# Patient Record
Sex: FEMALE | Race: WHITE | Employment: UNEMPLOYED | URBAN - METROPOLITAN AREA
[De-identification: names, ages, dates, MRNs, and addresses within clinical notes are randomized per-mention and may not be internally consistent; named-entity substitution may affect disease eponyms.]

---

## 2017-02-17 ENCOUNTER — OFFICE VISIT (OUTPATIENT)
Dept: FAMILY MEDICINE CLINIC | Age: 52
End: 2017-02-17

## 2017-02-17 VITALS
HEIGHT: 66 IN | BODY MASS INDEX: 20.38 KG/M2 | DIASTOLIC BLOOD PRESSURE: 82 MMHG | HEART RATE: 69 BPM | SYSTOLIC BLOOD PRESSURE: 110 MMHG | WEIGHT: 126.8 LBS

## 2017-02-17 DIAGNOSIS — E07.9 THYROID DISORDER: ICD-10-CM

## 2017-02-17 DIAGNOSIS — Z00.00 ROUTINE PHYSICAL EXAMINATION: ICD-10-CM

## 2017-02-17 DIAGNOSIS — Z00.00 ROUTINE PHYSICAL EXAMINATION: Primary | ICD-10-CM

## 2017-02-17 DIAGNOSIS — L30.9 DERMATITIS: ICD-10-CM

## 2017-02-17 DIAGNOSIS — E78.00 HYPERCHOLESTEREMIA: ICD-10-CM

## 2017-02-17 DIAGNOSIS — E55.9 VITAMIN D DEFICIENCY: ICD-10-CM

## 2017-02-17 DIAGNOSIS — I10 ESSENTIAL HYPERTENSION: ICD-10-CM

## 2017-02-17 DIAGNOSIS — Z23 NEED FOR TDAP VACCINATION: ICD-10-CM

## 2017-02-17 LAB
A/G RATIO: 1.5 (ref 1.1–2.2)
ALBUMIN SERPL-MCNC: 4.4 G/DL (ref 3.4–5)
ALP BLD-CCNC: 72 U/L (ref 40–129)
ALT SERPL-CCNC: 19 U/L (ref 10–40)
ANION GAP SERPL CALCULATED.3IONS-SCNC: 18 MMOL/L (ref 3–16)
AST SERPL-CCNC: 27 U/L (ref 15–37)
BASOPHILS ABSOLUTE: 0.1 K/UL (ref 0–0.2)
BASOPHILS RELATIVE PERCENT: 1.1 %
BILIRUB SERPL-MCNC: 0.5 MG/DL (ref 0–1)
BUN BLDV-MCNC: 9 MG/DL (ref 7–20)
CALCIUM SERPL-MCNC: 9.6 MG/DL (ref 8.3–10.6)
CHLORIDE BLD-SCNC: 103 MMOL/L (ref 99–110)
CHOLESTEROL, TOTAL: 204 MG/DL (ref 0–199)
CO2: 23 MMOL/L (ref 21–32)
CREAT SERPL-MCNC: 0.7 MG/DL (ref 0.6–1.1)
EOSINOPHILS ABSOLUTE: 0.3 K/UL (ref 0–0.6)
EOSINOPHILS RELATIVE PERCENT: 6.3 %
GFR AFRICAN AMERICAN: >60
GFR NON-AFRICAN AMERICAN: >60
GLOBULIN: 3 G/DL
GLUCOSE BLD-MCNC: 89 MG/DL (ref 70–99)
HCT VFR BLD CALC: 42.5 % (ref 36–48)
HDLC SERPL-MCNC: 94 MG/DL (ref 40–60)
HEMOGLOBIN: 14.1 G/DL (ref 12–16)
LDL CHOLESTEROL CALCULATED: 95 MG/DL
LYMPHOCYTES ABSOLUTE: 1.9 K/UL (ref 1–5.1)
LYMPHOCYTES RELATIVE PERCENT: 34.6 %
MCH RBC QN AUTO: 30.4 PG (ref 26–34)
MCHC RBC AUTO-ENTMCNC: 33.2 G/DL (ref 31–36)
MCV RBC AUTO: 91.5 FL (ref 80–100)
MONOCYTES ABSOLUTE: 0.5 K/UL (ref 0–1.3)
MONOCYTES RELATIVE PERCENT: 8.2 %
NEUTROPHILS ABSOLUTE: 2.7 K/UL (ref 1.7–7.7)
NEUTROPHILS RELATIVE PERCENT: 49.8 %
PDW BLD-RTO: 14 % (ref 12.4–15.4)
PLATELET # BLD: 178 K/UL (ref 135–450)
PMV BLD AUTO: 9.8 FL (ref 5–10.5)
POTASSIUM SERPL-SCNC: 4.8 MMOL/L (ref 3.5–5.1)
RBC # BLD: 4.65 M/UL (ref 4–5.2)
SODIUM BLD-SCNC: 144 MMOL/L (ref 136–145)
TOTAL PROTEIN: 7.4 G/DL (ref 6.4–8.2)
TRIGL SERPL-MCNC: 77 MG/DL (ref 0–150)
TSH REFLEX: 1.96 UIU/ML (ref 0.27–4.2)
VLDLC SERPL CALC-MCNC: 15 MG/DL
WBC # BLD: 5.5 K/UL (ref 4–11)

## 2017-02-17 PROCEDURE — 90715 TDAP VACCINE 7 YRS/> IM: CPT | Performed by: FAMILY MEDICINE

## 2017-02-17 PROCEDURE — 99396 PREV VISIT EST AGE 40-64: CPT | Performed by: FAMILY MEDICINE

## 2017-02-17 PROCEDURE — 90471 IMMUNIZATION ADMIN: CPT | Performed by: FAMILY MEDICINE

## 2017-02-17 RX ORDER — LISINOPRIL 5 MG/1
5 TABLET ORAL DAILY
Qty: 30 TABLET | Refills: 5 | Status: SHIPPED | OUTPATIENT
Start: 2017-02-17 | End: 2017-08-12 | Stop reason: SDUPTHER

## 2017-02-17 ASSESSMENT — ENCOUNTER SYMPTOMS
ALLERGIC/IMMUNOLOGIC NEGATIVE: 1
EYES NEGATIVE: 1

## 2017-02-18 LAB — VITAMIN D 25-HYDROXY: 49.3 NG/ML

## 2017-08-12 DIAGNOSIS — I10 ESSENTIAL HYPERTENSION: ICD-10-CM

## 2017-08-14 RX ORDER — LISINOPRIL 5 MG/1
TABLET ORAL
Qty: 30 TABLET | Refills: 4 | Status: SHIPPED | OUTPATIENT
Start: 2017-08-14 | End: 2018-01-14 | Stop reason: SDUPTHER

## 2017-09-25 ENCOUNTER — HOSPITAL ENCOUNTER (OUTPATIENT)
Dept: ENDOSCOPY | Age: 52
Discharge: HOME | End: 2017-09-25
Attending: INTERNAL MEDICINE | Admitting: INTERNAL MEDICINE

## 2017-09-25 VITALS
SYSTOLIC BLOOD PRESSURE: 121 MMHG | OXYGEN SATURATION: 100 % | HEART RATE: 55 BPM | HEIGHT: 65 IN | RESPIRATION RATE: 16 BRPM | BODY MASS INDEX: 22.33 KG/M2 | WEIGHT: 134 LBS | DIASTOLIC BLOOD PRESSURE: 80 MMHG | TEMPERATURE: 97 F

## 2017-09-25 RX ORDER — OXYCODONE HYDROCHLORIDE AND ACETAMINOPHEN 5; 325 MG/1; MG/1
2 TABLET ORAL PRN
Status: ACTIVE | OUTPATIENT
Start: 2017-09-25 | End: 2017-09-25

## 2017-09-25 RX ORDER — SODIUM CHLORIDE 0.9 % (FLUSH) 0.9 %
10 SYRINGE (ML) INJECTION PRN
Status: DISCONTINUED | OUTPATIENT
Start: 2017-09-25 | End: 2017-09-26 | Stop reason: HOSPADM

## 2017-09-25 RX ORDER — SODIUM CHLORIDE 9 MG/ML
INJECTION, SOLUTION INTRAVENOUS CONTINUOUS
Status: DISCONTINUED | OUTPATIENT
Start: 2017-09-25 | End: 2017-09-26 | Stop reason: HOSPADM

## 2017-09-25 RX ORDER — FENTANYL CITRATE 50 UG/ML
50 INJECTION, SOLUTION INTRAMUSCULAR; INTRAVENOUS EVERY 5 MIN PRN
Status: DISCONTINUED | OUTPATIENT
Start: 2017-09-25 | End: 2017-09-26 | Stop reason: HOSPADM

## 2017-09-25 RX ORDER — MORPHINE SULFATE 4 MG/ML
1 INJECTION, SOLUTION INTRAMUSCULAR; INTRAVENOUS EVERY 5 MIN PRN
Status: DISCONTINUED | OUTPATIENT
Start: 2017-09-25 | End: 2017-09-26 | Stop reason: HOSPADM

## 2017-09-25 RX ORDER — MORPHINE SULFATE 4 MG/ML
2 INJECTION, SOLUTION INTRAMUSCULAR; INTRAVENOUS EVERY 5 MIN PRN
Status: DISCONTINUED | OUTPATIENT
Start: 2017-09-25 | End: 2017-09-26 | Stop reason: HOSPADM

## 2017-09-25 RX ORDER — MEPERIDINE HYDROCHLORIDE 25 MG/ML
12.5 INJECTION INTRAMUSCULAR; INTRAVENOUS; SUBCUTANEOUS EVERY 5 MIN PRN
Status: DISCONTINUED | OUTPATIENT
Start: 2017-09-25 | End: 2017-09-26 | Stop reason: HOSPADM

## 2017-09-25 RX ORDER — OXYCODONE HYDROCHLORIDE AND ACETAMINOPHEN 5; 325 MG/1; MG/1
1 TABLET ORAL PRN
Status: ACTIVE | OUTPATIENT
Start: 2017-09-25 | End: 2017-09-25

## 2017-09-25 RX ORDER — FENTANYL CITRATE 50 UG/ML
25 INJECTION, SOLUTION INTRAMUSCULAR; INTRAVENOUS EVERY 5 MIN PRN
Status: DISCONTINUED | OUTPATIENT
Start: 2017-09-25 | End: 2017-09-26 | Stop reason: HOSPADM

## 2017-09-25 RX ORDER — SODIUM CHLORIDE 0.9 % (FLUSH) 0.9 %
10 SYRINGE (ML) INJECTION EVERY 12 HOURS SCHEDULED
Status: DISCONTINUED | OUTPATIENT
Start: 2017-09-25 | End: 2017-09-26 | Stop reason: HOSPADM

## 2017-09-25 RX ORDER — ONDANSETRON 2 MG/ML
4 INJECTION INTRAMUSCULAR; INTRAVENOUS
Status: ACTIVE | OUTPATIENT
Start: 2017-09-25 | End: 2017-09-25

## 2017-09-25 RX ADMIN — SODIUM CHLORIDE: 9 INJECTION, SOLUTION INTRAVENOUS at 10:49

## 2017-09-25 ASSESSMENT — PAIN SCALES - GENERAL
PAINLEVEL_OUTOF10: 0

## 2017-09-25 ASSESSMENT — ENCOUNTER SYMPTOMS: SHORTNESS OF BREATH: 0

## 2017-09-25 ASSESSMENT — PAIN - FUNCTIONAL ASSESSMENT: PAIN_FUNCTIONAL_ASSESSMENT: 0-10

## 2018-01-14 DIAGNOSIS — I10 ESSENTIAL HYPERTENSION: ICD-10-CM

## 2018-01-15 RX ORDER — LISINOPRIL 5 MG/1
TABLET ORAL
Qty: 30 TABLET | Refills: 1 | Status: SHIPPED | OUTPATIENT
Start: 2018-01-15 | End: 2018-02-19 | Stop reason: SDUPTHER

## 2018-01-15 NOTE — TELEPHONE ENCOUNTER
Last seen 2/17/2017  No return date  Future appointment scheduled for 2/19/2018  Last labs 2/17/2017

## 2018-02-19 ENCOUNTER — OFFICE VISIT (OUTPATIENT)
Dept: FAMILY MEDICINE CLINIC | Age: 53
End: 2018-02-19

## 2018-02-19 VITALS
HEART RATE: 83 BPM | HEIGHT: 67 IN | WEIGHT: 141.6 LBS | DIASTOLIC BLOOD PRESSURE: 74 MMHG | OXYGEN SATURATION: 98 % | BODY MASS INDEX: 22.22 KG/M2 | SYSTOLIC BLOOD PRESSURE: 110 MMHG

## 2018-02-19 DIAGNOSIS — Z00.00 ROUTINE PHYSICAL EXAMINATION: Primary | ICD-10-CM

## 2018-02-19 DIAGNOSIS — I10 ESSENTIAL HYPERTENSION: ICD-10-CM

## 2018-02-19 DIAGNOSIS — L41.0 PITYRIASIS LICHENOIDES: ICD-10-CM

## 2018-02-19 DIAGNOSIS — Z13.220 LIPID SCREENING: ICD-10-CM

## 2018-02-19 DIAGNOSIS — E07.9 THYROID DISORDER: ICD-10-CM

## 2018-02-19 DIAGNOSIS — Z13.1 DIABETES MELLITUS SCREENING: ICD-10-CM

## 2018-02-19 DIAGNOSIS — G47.9 SLEEP DISORDER: ICD-10-CM

## 2018-02-19 PROCEDURE — 99396 PREV VISIT EST AGE 40-64: CPT | Performed by: FAMILY MEDICINE

## 2018-02-19 RX ORDER — LISINOPRIL 5 MG/1
TABLET ORAL
Qty: 90 TABLET | Refills: 2 | Status: SHIPPED | OUTPATIENT
Start: 2018-02-19 | End: 2018-12-15 | Stop reason: SDUPTHER

## 2018-02-19 RX ORDER — DOXEPIN HYDROCHLORIDE 10 MG/1
10 CAPSULE ORAL NIGHTLY
Qty: 30 CAPSULE | Refills: 1 | Status: SHIPPED | OUTPATIENT
Start: 2018-02-19 | End: 2019-02-04

## 2018-02-19 ASSESSMENT — ENCOUNTER SYMPTOMS
ALLERGIC/IMMUNOLOGIC NEGATIVE: 1
RESPIRATORY NEGATIVE: 1
EYES NEGATIVE: 1
SORE THROAT: 0

## 2018-02-19 ASSESSMENT — PATIENT HEALTH QUESTIONNAIRE - PHQ9
2. FEELING DOWN, DEPRESSED OR HOPELESS: 0
1. LITTLE INTEREST OR PLEASURE IN DOING THINGS: 0
SUM OF ALL RESPONSES TO PHQ QUESTIONS 1-9: 0
SUM OF ALL RESPONSES TO PHQ9 QUESTIONS 1 & 2: 0

## 2018-02-19 NOTE — PROGRESS NOTES
Subjective:      Patient ID: Kezia Garza is a 50 y.o. female. Chief Complaint   Patient presents with    Annual Exam       HPI   difficult to sleep   She can't shut her mind   snore difficult + ear plug  Father + using Ambien   Pt tried OTC Melatonin  Cold Rx work one night but not next night  She feels tired all day long  Lipid disorder she did try healthy diet & exercise she did be super strict   Past Medical History:   Diagnosis Date    Hypertension      Past Surgical History:   Procedure Laterality Date    HYSTERECTOMY      SARKIS AND BSO  2007    FHx Mother +ovarian cancer    THYROIDECTOMY, PARTIAL  2005    Nodule ,Benign Lt     Current Outpatient Prescriptions   Medication Sig Dispense Refill    doxepin (SINEQUAN) 10 MG capsule Take 1 capsule by mouth nightly 30 capsule 1    lisinopril (PRINIVIL;ZESTRIL) 5 MG tablet TAKE ONE TABLET BY MOUTH DAILY 90 tablet 2    Cholecalciferol (VITAMIN D) 2000 units CAPS capsule Take by mouth      aspirin 81 MG tablet Take 81 mg by mouth daily       No current facility-administered medications for this visit.           No Known Allergies  Family History   Problem Relation Age of Onset    Cancer Mother      ovarian cancer @age 54    Cancer Father      Now 75 yo     Social History     Social History    Marital status:      Spouse name: Marya Armstrong ( 2007)    Number of children: 2    Years of education: N/A     Occupational History          Layed off few years ago Stay home          takes care of Horse     Social History Main Topics    Smoking status: Never Smoker    Smokeless tobacco: Never Used    Alcohol use No    Drug use: No    Sexual activity: Yes     Partners: Male      Comment: M ( 2 nd )+two kids 25 &28 yo     Other Topics Concern    Not on file     Social History Narrative    New G baby     Immunization History   Administered Date(s) Administered    Influenza Virus Vaccine 10/08/2012, 01/02/2018    Influenza, Intradermal,

## 2018-02-20 LAB
A/G RATIO: 1.7 (ref 1.1–2.2)
ALBUMIN SERPL-MCNC: 4.7 G/DL (ref 3.4–5)
ALP BLD-CCNC: 70 U/L (ref 40–129)
ALT SERPL-CCNC: 12 U/L (ref 10–40)
ANION GAP SERPL CALCULATED.3IONS-SCNC: 13 MMOL/L (ref 3–16)
AST SERPL-CCNC: 25 U/L (ref 15–37)
BASOPHILS ABSOLUTE: 0 K/UL (ref 0–0.2)
BASOPHILS RELATIVE PERCENT: 0.9 %
BILIRUB SERPL-MCNC: 0.4 MG/DL (ref 0–1)
BUN BLDV-MCNC: 14 MG/DL (ref 7–20)
CALCIUM SERPL-MCNC: 9.9 MG/DL (ref 8.3–10.6)
CHLORIDE BLD-SCNC: 104 MMOL/L (ref 99–110)
CHOLESTEROL, TOTAL: 255 MG/DL (ref 0–199)
CO2: 26 MMOL/L (ref 21–32)
CREAT SERPL-MCNC: 0.7 MG/DL (ref 0.6–1.1)
EOSINOPHILS ABSOLUTE: 0.2 K/UL (ref 0–0.6)
EOSINOPHILS RELATIVE PERCENT: 3.2 %
ESTIMATED AVERAGE GLUCOSE: 102.5 MG/DL
GFR AFRICAN AMERICAN: >60
GFR NON-AFRICAN AMERICAN: >60
GLOBULIN: 2.8 G/DL
GLUCOSE BLD-MCNC: 90 MG/DL (ref 70–99)
HBA1C MFR BLD: 5.2 %
HCT VFR BLD CALC: 41.8 % (ref 36–48)
HDLC SERPL-MCNC: 82 MG/DL (ref 40–60)
HEMOGLOBIN: 14 G/DL (ref 12–16)
LDL CHOLESTEROL CALCULATED: 146 MG/DL
LYMPHOCYTES ABSOLUTE: 1.7 K/UL (ref 1–5.1)
LYMPHOCYTES RELATIVE PERCENT: 30.4 %
MCH RBC QN AUTO: 30.8 PG (ref 26–34)
MCHC RBC AUTO-ENTMCNC: 33.4 G/DL (ref 31–36)
MCV RBC AUTO: 92.1 FL (ref 80–100)
MONOCYTES ABSOLUTE: 0.4 K/UL (ref 0–1.3)
MONOCYTES RELATIVE PERCENT: 7 %
NEUTROPHILS ABSOLUTE: 3.3 K/UL (ref 1.7–7.7)
NEUTROPHILS RELATIVE PERCENT: 58.5 %
PDW BLD-RTO: 13.7 % (ref 12.4–15.4)
PLATELET # BLD: 199 K/UL (ref 135–450)
PMV BLD AUTO: 9.6 FL (ref 5–10.5)
POTASSIUM SERPL-SCNC: 4.5 MMOL/L (ref 3.5–5.1)
RBC # BLD: 4.54 M/UL (ref 4–5.2)
SODIUM BLD-SCNC: 143 MMOL/L (ref 136–145)
TOTAL PROTEIN: 7.5 G/DL (ref 6.4–8.2)
TRIGL SERPL-MCNC: 135 MG/DL (ref 0–150)
TSH SERPL DL<=0.05 MIU/L-ACNC: 2.61 UIU/ML (ref 0.27–4.2)
VLDLC SERPL CALC-MCNC: 27 MG/DL
WBC # BLD: 5.6 K/UL (ref 4–11)

## 2018-02-21 RX ORDER — ROSUVASTATIN CALCIUM 10 MG/1
10 TABLET, COATED ORAL NIGHTLY
Qty: 30 TABLET | Refills: 3 | Status: SHIPPED | OUTPATIENT
Start: 2018-02-21 | End: 2018-06-15 | Stop reason: SDUPTHER

## 2018-05-29 DIAGNOSIS — E78.00 HYPERCHOLESTEREMIA: Primary | ICD-10-CM

## 2018-06-04 DIAGNOSIS — E78.00 HYPERCHOLESTEREMIA: ICD-10-CM

## 2018-06-05 LAB
A/G RATIO: 1.9 (ref 1.1–2.2)
ALBUMIN SERPL-MCNC: 4.7 G/DL (ref 3.4–5)
ALP BLD-CCNC: 69 U/L (ref 40–129)
ALT SERPL-CCNC: 15 U/L (ref 10–40)
ANION GAP SERPL CALCULATED.3IONS-SCNC: 15 MMOL/L (ref 3–16)
AST SERPL-CCNC: 25 U/L (ref 15–37)
BILIRUB SERPL-MCNC: 0.4 MG/DL (ref 0–1)
BUN BLDV-MCNC: 15 MG/DL (ref 7–20)
CALCIUM SERPL-MCNC: 9.7 MG/DL (ref 8.3–10.6)
CHLORIDE BLD-SCNC: 102 MMOL/L (ref 99–110)
CHOLESTEROL, TOTAL: 142 MG/DL (ref 0–199)
CO2: 24 MMOL/L (ref 21–32)
CREAT SERPL-MCNC: 0.7 MG/DL (ref 0.6–1.1)
GFR AFRICAN AMERICAN: >60
GFR NON-AFRICAN AMERICAN: >60
GLOBULIN: 2.5 G/DL
GLUCOSE BLD-MCNC: 88 MG/DL (ref 70–99)
HDLC SERPL-MCNC: 87 MG/DL (ref 40–60)
LDL CHOLESTEROL CALCULATED: 44 MG/DL
POTASSIUM SERPL-SCNC: 4.9 MMOL/L (ref 3.5–5.1)
SODIUM BLD-SCNC: 141 MMOL/L (ref 136–145)
TOTAL PROTEIN: 7.2 G/DL (ref 6.4–8.2)
TRIGL SERPL-MCNC: 57 MG/DL (ref 0–150)
VLDLC SERPL CALC-MCNC: 11 MG/DL

## 2018-06-15 RX ORDER — ROSUVASTATIN CALCIUM 10 MG/1
TABLET, COATED ORAL
Qty: 30 TABLET | Refills: 2 | Status: SHIPPED | OUTPATIENT
Start: 2018-06-15 | End: 2018-09-18 | Stop reason: SDUPTHER

## 2018-12-15 DIAGNOSIS — I10 ESSENTIAL HYPERTENSION: ICD-10-CM

## 2018-12-17 RX ORDER — LISINOPRIL 5 MG/1
TABLET ORAL
Qty: 30 TABLET | Refills: 1 | Status: SHIPPED | OUTPATIENT
Start: 2018-12-17 | End: 2019-02-14 | Stop reason: SDUPTHER

## 2019-02-04 ENCOUNTER — OFFICE VISIT (OUTPATIENT)
Dept: FAMILY MEDICINE CLINIC | Age: 54
End: 2019-02-04
Payer: COMMERCIAL

## 2019-02-04 VITALS
HEART RATE: 64 BPM | BODY MASS INDEX: 22.53 KG/M2 | WEIGHT: 140.2 LBS | DIASTOLIC BLOOD PRESSURE: 78 MMHG | HEIGHT: 66 IN | SYSTOLIC BLOOD PRESSURE: 110 MMHG | OXYGEN SATURATION: 95 %

## 2019-02-04 DIAGNOSIS — E78.00 HYPERCHOLESTEREMIA: ICD-10-CM

## 2019-02-04 DIAGNOSIS — M25.9: ICD-10-CM

## 2019-02-04 DIAGNOSIS — I10 ESSENTIAL HYPERTENSION: ICD-10-CM

## 2019-02-04 DIAGNOSIS — Z01.818 PRE-OP EXAM: Primary | ICD-10-CM

## 2019-02-04 DIAGNOSIS — G47.9 SLEEP DISORDER: ICD-10-CM

## 2019-02-04 PROCEDURE — 99242 OFF/OP CONSLTJ NEW/EST SF 20: CPT | Performed by: FAMILY MEDICINE

## 2019-02-04 PROCEDURE — 93000 ELECTROCARDIOGRAM COMPLETE: CPT | Performed by: FAMILY MEDICINE

## 2019-02-04 RX ORDER — MIRTAZAPINE 15 MG/1
15 TABLET, FILM COATED ORAL NIGHTLY
Qty: 30 TABLET | Refills: 1 | Status: SHIPPED | OUTPATIENT
Start: 2019-02-04 | End: 2019-02-22

## 2019-02-04 ASSESSMENT — ENCOUNTER SYMPTOMS
ALLERGIC/IMMUNOLOGIC NEGATIVE: 1
CONSTIPATION: 0
COUGH: 0
RESPIRATORY NEGATIVE: 1
COLOR CHANGE: 0
RHINORRHEA: 0
CHEST TIGHTNESS: 0
SORE THROAT: 0
APNEA: 0
CHOKING: 0
EYES NEGATIVE: 1
SHORTNESS OF BREATH: 0
ABDOMINAL PAIN: 0
NAUSEA: 0
VOMITING: 0
SINUS PAIN: 0
VOICE CHANGE: 0
PHOTOPHOBIA: 0
WHEEZING: 0
GASTROINTESTINAL NEGATIVE: 1
EYE PAIN: 0
BLOOD IN STOOL: 0
SINUS PRESSURE: 0
EYE REDNESS: 0
EYE ITCHING: 0
EYE DISCHARGE: 0
ABDOMINAL DISTENTION: 0
TROUBLE SWALLOWING: 0
BACK PAIN: 0
DIARRHEA: 0

## 2019-02-22 ENCOUNTER — OFFICE VISIT (OUTPATIENT)
Dept: FAMILY MEDICINE CLINIC | Age: 54
End: 2019-02-22
Payer: COMMERCIAL

## 2019-02-22 VITALS
WEIGHT: 138 LBS | HEART RATE: 69 BPM | SYSTOLIC BLOOD PRESSURE: 126 MMHG | TEMPERATURE: 97.9 F | OXYGEN SATURATION: 97 % | DIASTOLIC BLOOD PRESSURE: 78 MMHG | BODY MASS INDEX: 22.18 KG/M2 | HEIGHT: 66 IN

## 2019-02-22 DIAGNOSIS — E55.9 VITAMIN D DEFICIENCY: ICD-10-CM

## 2019-02-22 DIAGNOSIS — E78.00 HYPERCHOLESTEREMIA: ICD-10-CM

## 2019-02-22 DIAGNOSIS — G47.9 SLEEP DISORDER: ICD-10-CM

## 2019-02-22 DIAGNOSIS — I10 ESSENTIAL HYPERTENSION: ICD-10-CM

## 2019-02-22 DIAGNOSIS — S20.212A CONTUSION OF RIB ON LEFT SIDE, INITIAL ENCOUNTER: ICD-10-CM

## 2019-02-22 DIAGNOSIS — Z00.00 ROUTINE PHYSICAL EXAMINATION: Primary | ICD-10-CM

## 2019-02-22 DIAGNOSIS — Z00.00 ROUTINE PHYSICAL EXAMINATION: ICD-10-CM

## 2019-02-22 DIAGNOSIS — Z13.1 DIABETES MELLITUS SCREENING: ICD-10-CM

## 2019-02-22 LAB
A/G RATIO: 2 (ref 1.1–2.2)
ALBUMIN SERPL-MCNC: 4.8 G/DL (ref 3.4–5)
ALP BLD-CCNC: 73 U/L (ref 40–129)
ALT SERPL-CCNC: 30 U/L (ref 10–40)
ANION GAP SERPL CALCULATED.3IONS-SCNC: 16 MMOL/L (ref 3–16)
AST SERPL-CCNC: 26 U/L (ref 15–37)
BASOPHILS ABSOLUTE: 0 K/UL (ref 0–0.2)
BASOPHILS RELATIVE PERCENT: 0.4 %
BILIRUB SERPL-MCNC: 0.5 MG/DL (ref 0–1)
BUN BLDV-MCNC: 16 MG/DL (ref 7–20)
CALCIUM SERPL-MCNC: 9.8 MG/DL (ref 8.3–10.6)
CHLORIDE BLD-SCNC: 103 MMOL/L (ref 99–110)
CHOLESTEROL, TOTAL: 148 MG/DL (ref 0–199)
CO2: 26 MMOL/L (ref 21–32)
CREAT SERPL-MCNC: 0.6 MG/DL (ref 0.6–1.1)
EOSINOPHILS ABSOLUTE: 0.3 K/UL (ref 0–0.6)
EOSINOPHILS RELATIVE PERCENT: 3.1 %
GFR AFRICAN AMERICAN: >60
GFR NON-AFRICAN AMERICAN: >60
GLOBULIN: 2.4 G/DL
GLUCOSE BLD-MCNC: 90 MG/DL (ref 70–99)
HCT VFR BLD CALC: 40.4 % (ref 36–48)
HDLC SERPL-MCNC: 78 MG/DL (ref 40–60)
HEMOGLOBIN: 13.7 G/DL (ref 12–16)
LDL CHOLESTEROL CALCULATED: 53 MG/DL
LYMPHOCYTES ABSOLUTE: 2.4 K/UL (ref 1–5.1)
LYMPHOCYTES RELATIVE PERCENT: 27.8 %
MCH RBC QN AUTO: 30.6 PG (ref 26–34)
MCHC RBC AUTO-ENTMCNC: 33.8 G/DL (ref 31–36)
MCV RBC AUTO: 90.3 FL (ref 80–100)
MONOCYTES ABSOLUTE: 0.6 K/UL (ref 0–1.3)
MONOCYTES RELATIVE PERCENT: 7.2 %
NEUTROPHILS ABSOLUTE: 5.4 K/UL (ref 1.7–7.7)
NEUTROPHILS RELATIVE PERCENT: 61.5 %
PDW BLD-RTO: 13.7 % (ref 12.4–15.4)
PLATELET # BLD: 187 K/UL (ref 135–450)
PMV BLD AUTO: 8.7 FL (ref 5–10.5)
POTASSIUM SERPL-SCNC: 4.8 MMOL/L (ref 3.5–5.1)
RBC # BLD: 4.47 M/UL (ref 4–5.2)
SODIUM BLD-SCNC: 145 MMOL/L (ref 136–145)
TOTAL PROTEIN: 7.2 G/DL (ref 6.4–8.2)
TRIGL SERPL-MCNC: 87 MG/DL (ref 0–150)
TSH SERPL DL<=0.05 MIU/L-ACNC: 0.76 UIU/ML (ref 0.27–4.2)
VITAMIN D 25-HYDROXY: 58.9 NG/ML
VLDLC SERPL CALC-MCNC: 17 MG/DL
WBC # BLD: 8.8 K/UL (ref 4–11)

## 2019-02-22 PROCEDURE — 36415 COLL VENOUS BLD VENIPUNCTURE: CPT | Performed by: FAMILY MEDICINE

## 2019-02-22 PROCEDURE — 99396 PREV VISIT EST AGE 40-64: CPT | Performed by: FAMILY MEDICINE

## 2019-02-22 RX ORDER — ROSUVASTATIN CALCIUM 10 MG/1
TABLET, COATED ORAL
Qty: 90 TABLET | Refills: 1 | Status: SHIPPED | OUTPATIENT
Start: 2019-02-22 | End: 2019-09-10 | Stop reason: SDUPTHER

## 2019-02-22 RX ORDER — ZOLPIDEM TARTRATE 5 MG/1
5 TABLET ORAL NIGHTLY PRN
Qty: 14 TABLET | Refills: 0 | Status: SHIPPED | OUTPATIENT
Start: 2019-02-22 | End: 2019-03-08

## 2019-02-22 ASSESSMENT — ENCOUNTER SYMPTOMS
APNEA: 0
WHEEZING: 0
SINUS PRESSURE: 0
RHINORRHEA: 0
EYE REDNESS: 0
CHOKING: 0
VOMITING: 0
COUGH: 0
EYE PAIN: 0
TROUBLE SWALLOWING: 0
EYE DISCHARGE: 0
SHORTNESS OF BREATH: 0
VOICE CHANGE: 0
CONSTIPATION: 0
CHEST TIGHTNESS: 0
ABDOMINAL DISTENTION: 0
PHOTOPHOBIA: 0
BACK PAIN: 0
DIARRHEA: 0
ABDOMINAL PAIN: 0
SORE THROAT: 0
SINUS PAIN: 0
BLOOD IN STOOL: 0
NAUSEA: 0
COLOR CHANGE: 0
EYE ITCHING: 0

## 2019-02-22 ASSESSMENT — PATIENT HEALTH QUESTIONNAIRE - PHQ9
SUM OF ALL RESPONSES TO PHQ9 QUESTIONS 1 & 2: 0
SUM OF ALL RESPONSES TO PHQ QUESTIONS 1-9: 0
SUM OF ALL RESPONSES TO PHQ QUESTIONS 1-9: 0
1. LITTLE INTEREST OR PLEASURE IN DOING THINGS: 0
2. FEELING DOWN, DEPRESSED OR HOPELESS: 0

## 2019-02-23 LAB
ESTIMATED AVERAGE GLUCOSE: 119.8 MG/DL
HBA1C MFR BLD: 5.8 %

## 2019-03-27 ENCOUNTER — APPOINTMENT (RX ONLY)
Dept: URBAN - METROPOLITAN AREA CLINIC 170 | Facility: CLINIC | Age: 54
Setting detail: DERMATOLOGY
End: 2019-03-27

## 2019-03-27 DIAGNOSIS — L40.0 PSORIASIS VULGARIS: ICD-10-CM

## 2019-03-27 DIAGNOSIS — L41.1 PITYRIASIS LICHENOIDES CHRONICA: ICD-10-CM

## 2019-03-27 PROBLEM — E78.5 HYPERLIPIDEMIA, UNSPECIFIED: Status: ACTIVE | Noted: 2019-03-27

## 2019-03-27 PROBLEM — I10 ESSENTIAL (PRIMARY) HYPERTENSION: Status: ACTIVE | Noted: 2019-03-27

## 2019-03-27 PROBLEM — L30.9 DERMATITIS, UNSPECIFIED: Status: ACTIVE | Noted: 2019-03-27

## 2019-03-27 PROBLEM — L70.0 ACNE VULGARIS: Status: ACTIVE | Noted: 2019-03-27

## 2019-03-27 PROCEDURE — ? COUNSELING

## 2019-03-27 PROCEDURE — 99202 OFFICE O/P NEW SF 15 MIN: CPT | Mod: 25

## 2019-03-27 PROCEDURE — ? BIOPSY BY PUNCH METHOD

## 2019-03-27 PROCEDURE — 11104 PUNCH BX SKIN SINGLE LESION: CPT

## 2019-03-27 ASSESSMENT — LOCATION DETAILED DESCRIPTION DERM: LOCATION DETAILED: LEFT SUPERIOR LATERAL LOWER BACK

## 2019-03-27 ASSESSMENT — LOCATION ZONE DERM: LOCATION ZONE: TRUNK

## 2019-03-27 ASSESSMENT — LOCATION SIMPLE DESCRIPTION DERM: LOCATION SIMPLE: LEFT LOWER BACK

## 2019-03-27 NOTE — PROCEDURE: BIOPSY BY PUNCH METHOD
Punch Size In Mm: 4
Detail Level: Detailed
Suture Removal: 14 days
Billing Type: Third-Party Bill
Size Of Lesion In Cm (Optional): 0
Anesthesia Type: 2% lidocaine with epinephrine
Render Post-Care Instructions In Note?: no
Lab Facility: 3
Dressing: Band-Aid
Notification Instructions: Patient will be notified of biopsy results. However, patient instructed to call the office if not contacted within 2 weeks.
Consent: Written consent was obtained and risks were reviewed including but not limited to scarring, infection, bleeding, scabbing, incomplete removal, nerve damage and allergy to anesthesia.
Lab: -102
Biopsy Type: H and E
Hemostasis: None
Was A Bandage Applied: Yes
Home Suture Removal Text: Patient was provided a home suture removal kit and will remove their sutures at home.  If they have any questions or difficulties they will call the office.
Epidermal Sutures: 4-0 Nylon
Post-Care Instructions: I reviewed with the patient in detail post-care instructions. Patient is to keep the biopsy site dry overnight, and then apply bacitracin twice daily until healed. Patient may apply hydrogen peroxide soaks to remove any crusting.
Wound Care: Petrolatum

## 2019-03-27 NOTE — PROCEDURE: COUNSELING
Detail Level: Detailed
Patient Specific Counseling (Will Not Stick From Patient To Patient): \\nDdx includes plaque psoriasis--pt reports only one shave bx taken in the past. Rebiopsied today. Of note she is allergic to minocycline. Will try to determine if psoriasis vs PLC. If PLC, can consider erythromycin possibly. Tx pending pathology.

## 2019-03-27 NOTE — HPI: RASH
What Type Of Note Output Would You Prefer (Optional)?: Bullet Format
How Severe Is Your Rash?: moderate
Is This A New Presentation, Or A Follow-Up?: Rash
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 Currently using moisturizer wants treatment options

## 2019-04-01 ENCOUNTER — PATIENT MESSAGE (OUTPATIENT)
Dept: FAMILY MEDICINE CLINIC | Age: 54
End: 2019-04-01

## 2019-04-01 DIAGNOSIS — G47.9 SLEEP DISORDER: Primary | ICD-10-CM

## 2019-04-02 NOTE — TELEPHONE ENCOUNTER
From: Armani Love  To: Devan Yadav MD  Sent: 4/1/2019 7:30 PM EDT  Subject: Prescription Question    Can I get a refill on the zolpidem tartrate 5 mg? This really helps me shut my mind down so I can fall asleep. Its also not heavy so I dont feel drugged in the morning.    Thank you,  Armani Love

## 2019-04-03 RX ORDER — ZOLPIDEM TARTRATE 5 MG/1
5 TABLET ORAL NIGHTLY PRN
Qty: 30 TABLET | Refills: 0 | Status: SHIPPED | OUTPATIENT
Start: 2019-04-03 | End: 2019-06-12 | Stop reason: SDUPTHER

## 2019-04-10 ENCOUNTER — APPOINTMENT (RX ONLY)
Dept: URBAN - METROPOLITAN AREA CLINIC 170 | Facility: CLINIC | Age: 54
Setting detail: DERMATOLOGY
End: 2019-04-10

## 2019-04-10 DIAGNOSIS — L40.0 PSORIASIS VULGARIS: ICD-10-CM

## 2019-04-10 DIAGNOSIS — Z48.02 ENCOUNTER FOR REMOVAL OF SUTURES: ICD-10-CM

## 2019-04-10 PROCEDURE — 99213 OFFICE O/P EST LOW 20 MIN: CPT

## 2019-04-10 PROCEDURE — ? PRESCRIPTION

## 2019-04-10 PROCEDURE — ? SUTURE REMOVAL (GLOBAL PERIOD)

## 2019-04-10 PROCEDURE — ? TREATMENT REGIMEN

## 2019-04-10 PROCEDURE — 99024 POSTOP FOLLOW-UP VISIT: CPT

## 2019-04-10 PROCEDURE — ? COUNSELING

## 2019-04-10 RX ORDER — HYDROCORTISONE 25 MG/G
OINTMENT TOPICAL
Qty: 1 | Refills: 3 | Status: ERX | COMMUNITY
Start: 2019-04-10

## 2019-04-10 RX ORDER — FLUOCINONIDE 0.5 MG/ML
OINTMENT TOPICAL
Qty: 1 | Refills: 3 | Status: ERX | COMMUNITY
Start: 2019-04-10

## 2019-04-10 RX ORDER — CALCIPOTRIENE 50 UG/G
CREAM TOPICAL
Qty: 1 | Refills: 3 | Status: ERX | COMMUNITY
Start: 2019-04-10

## 2019-04-10 RX ADMIN — CALCIPOTRIENE: 50 CREAM TOPICAL at 15:11

## 2019-04-10 RX ADMIN — HYDROCORTISONE: 25 OINTMENT TOPICAL at 15:12

## 2019-04-10 RX ADMIN — FLUOCINONIDE: 0.5 OINTMENT TOPICAL at 19:20

## 2019-04-10 ASSESSMENT — LOCATION ZONE DERM: LOCATION ZONE: TRUNK

## 2019-04-10 ASSESSMENT — LOCATION DETAILED DESCRIPTION DERM: LOCATION DETAILED: LEFT SUPERIOR LATERAL LOWER BACK

## 2019-04-10 ASSESSMENT — LOCATION SIMPLE DESCRIPTION DERM: LOCATION SIMPLE: LEFT LOWER BACK

## 2019-04-10 NOTE — PROCEDURE: COUNSELING
Patient Specific Counseling (Will Not Stick From Patient To Patient): Discussed topicals, otezla, and injectables. Will plan to start otezla in six weeks if no improvement. Pt opts to treat with topicals for now.
Detail Level: Zone

## 2019-04-10 NOTE — PROCEDURE: SUTURE REMOVAL (GLOBAL PERIOD)
Detail Level: Detailed
Add 33966 Cpt? (Important Note: In 2017 The Use Of 31949 Is Being Tracked By Cms To Determine Future Global Period Reimbursement For Global Periods): yes

## 2019-06-12 DIAGNOSIS — G47.9 SLEEP DISORDER: ICD-10-CM

## 2019-06-12 RX ORDER — ZOLPIDEM TARTRATE 5 MG/1
5 TABLET ORAL NIGHTLY PRN
Qty: 30 TABLET | Refills: 0 | Status: SHIPPED | OUTPATIENT
Start: 2019-06-12 | End: 2019-08-23 | Stop reason: SDUPTHER

## 2019-08-21 ENCOUNTER — PATIENT MESSAGE (OUTPATIENT)
Dept: FAMILY MEDICINE CLINIC | Age: 54
End: 2019-08-21

## 2019-08-21 DIAGNOSIS — G47.9 SLEEP DISORDER: ICD-10-CM

## 2019-08-23 RX ORDER — ZOLPIDEM TARTRATE 5 MG/1
5 TABLET ORAL NIGHTLY PRN
Qty: 30 TABLET | Refills: 0 | Status: SHIPPED | OUTPATIENT
Start: 2019-08-23 | End: 2020-03-02 | Stop reason: SDUPTHER

## 2019-10-21 DIAGNOSIS — G47.9 SLEEP DISORDER: ICD-10-CM

## 2019-10-21 RX ORDER — ZOLPIDEM TARTRATE 5 MG/1
5 TABLET ORAL NIGHTLY PRN
Qty: 30 TABLET | Refills: 0 | Status: SHIPPED | OUTPATIENT
Start: 2019-10-21 | End: 2019-12-18 | Stop reason: SDUPTHER

## 2019-12-17 ENCOUNTER — PATIENT MESSAGE (OUTPATIENT)
Dept: PRIMARY CARE CLINIC | Age: 54
End: 2019-12-17

## 2019-12-17 DIAGNOSIS — G47.9 SLEEP DISORDER: ICD-10-CM

## 2019-12-18 RX ORDER — ZOLPIDEM TARTRATE 5 MG/1
5 TABLET ORAL NIGHTLY PRN
Qty: 30 TABLET | Refills: 0 | Status: SHIPPED | OUTPATIENT
Start: 2019-12-18 | End: 2020-05-08 | Stop reason: SDUPTHER

## 2019-12-22 DIAGNOSIS — E78.00 HYPERCHOLESTEREMIA: ICD-10-CM

## 2019-12-23 RX ORDER — ROSUVASTATIN CALCIUM 10 MG/1
TABLET, COATED ORAL
Qty: 90 TABLET | Refills: 0 | Status: SHIPPED | OUTPATIENT
Start: 2019-12-23 | End: 2020-03-18

## 2020-02-17 RX ORDER — LISINOPRIL 5 MG/1
TABLET ORAL
Qty: 30 TABLET | Refills: 0 | Status: SHIPPED | OUTPATIENT
Start: 2020-02-17 | End: 2020-03-16

## 2020-02-24 ENCOUNTER — OFFICE VISIT (OUTPATIENT)
Dept: PRIMARY CARE CLINIC | Age: 55
End: 2020-02-24
Payer: COMMERCIAL

## 2020-02-24 VITALS
HEART RATE: 68 BPM | OXYGEN SATURATION: 99 % | SYSTOLIC BLOOD PRESSURE: 110 MMHG | HEIGHT: 66 IN | WEIGHT: 129.6 LBS | BODY MASS INDEX: 20.83 KG/M2 | DIASTOLIC BLOOD PRESSURE: 80 MMHG

## 2020-02-24 DIAGNOSIS — Z13.1 DIABETES MELLITUS SCREENING: ICD-10-CM

## 2020-02-24 DIAGNOSIS — Z00.00 ROUTINE PHYSICAL EXAMINATION: ICD-10-CM

## 2020-02-24 DIAGNOSIS — E78.00 HYPERCHOLESTEREMIA: ICD-10-CM

## 2020-02-24 LAB
A/G RATIO: 1.9 (ref 1.1–2.2)
ALBUMIN SERPL-MCNC: 4.6 G/DL (ref 3.4–5)
ALP BLD-CCNC: 89 U/L (ref 40–129)
ALT SERPL-CCNC: 17 U/L (ref 10–40)
ANION GAP SERPL CALCULATED.3IONS-SCNC: 15 MMOL/L (ref 3–16)
AST SERPL-CCNC: 26 U/L (ref 15–37)
BASOPHILS ABSOLUTE: 0 K/UL (ref 0–0.2)
BASOPHILS RELATIVE PERCENT: 0.6 %
BILIRUB SERPL-MCNC: 0.5 MG/DL (ref 0–1)
BUN BLDV-MCNC: 18 MG/DL (ref 7–20)
CALCIUM SERPL-MCNC: 10 MG/DL (ref 8.3–10.6)
CHLORIDE BLD-SCNC: 105 MMOL/L (ref 99–110)
CHOLESTEROL, TOTAL: 129 MG/DL (ref 0–199)
CO2: 25 MMOL/L (ref 21–32)
CREAT SERPL-MCNC: 0.9 MG/DL (ref 0.6–1.1)
EOSINOPHILS ABSOLUTE: 0.1 K/UL (ref 0–0.6)
EOSINOPHILS RELATIVE PERCENT: 1.8 %
GFR AFRICAN AMERICAN: >60
GFR NON-AFRICAN AMERICAN: >60
GLOBULIN: 2.4 G/DL
GLUCOSE BLD-MCNC: 91 MG/DL (ref 70–99)
HCT VFR BLD CALC: 41.2 % (ref 36–48)
HDLC SERPL-MCNC: 78 MG/DL (ref 40–60)
HEMOGLOBIN: 13.8 G/DL (ref 12–16)
LDL CHOLESTEROL CALCULATED: 36 MG/DL
LYMPHOCYTES ABSOLUTE: 2 K/UL (ref 1–5.1)
LYMPHOCYTES RELATIVE PERCENT: 26.1 %
MCH RBC QN AUTO: 30.8 PG (ref 26–34)
MCHC RBC AUTO-ENTMCNC: 33.5 G/DL (ref 31–36)
MCV RBC AUTO: 92 FL (ref 80–100)
MONOCYTES ABSOLUTE: 0.5 K/UL (ref 0–1.3)
MONOCYTES RELATIVE PERCENT: 6.2 %
NEUTROPHILS ABSOLUTE: 4.9 K/UL (ref 1.7–7.7)
NEUTROPHILS RELATIVE PERCENT: 65.3 %
PDW BLD-RTO: 14 % (ref 12.4–15.4)
PLATELET # BLD: 141 K/UL (ref 135–450)
PMV BLD AUTO: 10.1 FL (ref 5–10.5)
POTASSIUM SERPL-SCNC: 5.5 MMOL/L (ref 3.5–5.1)
RBC # BLD: 4.48 M/UL (ref 4–5.2)
SODIUM BLD-SCNC: 145 MMOL/L (ref 136–145)
TOTAL PROTEIN: 7 G/DL (ref 6.4–8.2)
TRIGL SERPL-MCNC: 75 MG/DL (ref 0–150)
TSH SERPL DL<=0.05 MIU/L-ACNC: 2.38 UIU/ML (ref 0.27–4.2)
VLDLC SERPL CALC-MCNC: 15 MG/DL
WBC # BLD: 7.5 K/UL (ref 4–11)

## 2020-02-24 PROCEDURE — 99396 PREV VISIT EST AGE 40-64: CPT | Performed by: FAMILY MEDICINE

## 2020-02-24 SDOH — ECONOMIC STABILITY: FOOD INSECURITY: WITHIN THE PAST 12 MONTHS, THE FOOD YOU BOUGHT JUST DIDN'T LAST AND YOU DIDN'T HAVE MONEY TO GET MORE.: NEVER TRUE

## 2020-02-24 SDOH — ECONOMIC STABILITY: TRANSPORTATION INSECURITY
IN THE PAST 12 MONTHS, HAS LACK OF TRANSPORTATION KEPT YOU FROM MEETINGS, WORK, OR FROM GETTING THINGS NEEDED FOR DAILY LIVING?: NO

## 2020-02-24 SDOH — ECONOMIC STABILITY: FOOD INSECURITY: WITHIN THE PAST 12 MONTHS, YOU WORRIED THAT YOUR FOOD WOULD RUN OUT BEFORE YOU GOT MONEY TO BUY MORE.: NEVER TRUE

## 2020-02-24 SDOH — ECONOMIC STABILITY: INCOME INSECURITY: HOW HARD IS IT FOR YOU TO PAY FOR THE VERY BASICS LIKE FOOD, HOUSING, MEDICAL CARE, AND HEATING?: NOT HARD AT ALL

## 2020-02-24 SDOH — ECONOMIC STABILITY: TRANSPORTATION INSECURITY
IN THE PAST 12 MONTHS, HAS THE LACK OF TRANSPORTATION KEPT YOU FROM MEDICAL APPOINTMENTS OR FROM GETTING MEDICATIONS?: NO

## 2020-02-24 ASSESSMENT — ENCOUNTER SYMPTOMS
RHINORRHEA: 0
APNEA: 0
CHOKING: 0
ABDOMINAL PAIN: 0
COUGH: 0
CHEST TIGHTNESS: 0
VOMITING: 0
TROUBLE SWALLOWING: 0
EYE REDNESS: 0
CONSTIPATION: 0
EYE PAIN: 0
EYE ITCHING: 0
SINUS PRESSURE: 0
WHEEZING: 0
PHOTOPHOBIA: 0
BACK PAIN: 0
NAUSEA: 0
SHORTNESS OF BREATH: 0
BLOOD IN STOOL: 0
RESPIRATORY NEGATIVE: 1
SINUS PAIN: 0
DIARRHEA: 0
EYE DISCHARGE: 0
ABDOMINAL DISTENTION: 0
COLOR CHANGE: 0
SORE THROAT: 0
EYES NEGATIVE: 1
VOICE CHANGE: 0

## 2020-02-24 ASSESSMENT — PATIENT HEALTH QUESTIONNAIRE - PHQ9
1. LITTLE INTEREST OR PLEASURE IN DOING THINGS: 0
2. FEELING DOWN, DEPRESSED OR HOPELESS: 0
SUM OF ALL RESPONSES TO PHQ QUESTIONS 1-9: 0
SUM OF ALL RESPONSES TO PHQ9 QUESTIONS 1 & 2: 0
SUM OF ALL RESPONSES TO PHQ QUESTIONS 1-9: 0

## 2020-02-24 NOTE — PROGRESS NOTES
SUBJECTIVE:  Patient ID: Charis Pierson is a 48 y.o. female. Chief Complaint:  Chief Complaint   Patient presents with    Annual Exam       HPI   48year old female  Annual  Hypertension compliant with Rx no side effect  Lipid disorder compliant with Rx no side effect  Past Medical History:   Diagnosis Date    Hypercholesteremia     Hypertension      Past Surgical History:   Procedure Laterality Date    KNEE SURGERY Right 02/14/2019    [facility]    SARKIS AND BSO  1996    FHx Mother +ovarian cancer    THYROIDECTOMY, PARTIAL  2005    Nodule ,Benign Lt     Allergies   Allergen Reactions    Minocycline Swelling     Throat closes up     Family History   Problem Relation Age of Onset    Cancer Mother         ovarian cancer @age 54    Cancer Father         Now 77 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     Social History     Patient does not qualify to have social determinant information on file (likely too young). Social History Narrative         FT job    2 children 27 & 32    2 G children    Stay Home     Horses       Patient Active Problem List   Diagnosis    Hypertension    Eczema    Family history of ovarian cancer    Hypercholesteremia    Vitamin D deficiency    Pityriasis lichenoides     Current Outpatient Medications   Medication Sig Dispense Refill    Naproxen Sodium (ALEVE PO) Take by mouth 2 times daily      lisinopril (PRINIVIL;ZESTRIL) 5 MG tablet TAKE ONE TABLET BY MOUTH DAILY 30 tablet 0    rosuvastatin (CRESTOR) 10 MG tablet One tablet po qd 90 tablet 0    Cholecalciferol (VITAMIN D) 2000 units CAPS capsule Take by mouth      aspirin 81 MG tablet Take 81 mg by mouth daily       No current facility-administered medications for this visit.       Lab Results   Component Value Date    WBC 8.8 02/22/2019    HGB 13.7 02/22/2019    HCT 40.4 02/22/2019    MCV 90.3 02/22/2019     02/22/2019     Lab Results   Component Value Date    CHOL 148 02/22/2019    CHOL 142 06/04/2018 CHOL 255 (H) 02/19/2018     Lab Results   Component Value Date    TRIG 87 02/22/2019    TRIG 57 06/04/2018    TRIG 135 02/19/2018     Lab Results   Component Value Date    HDL 78 (H) 02/22/2019    HDL 87 (H) 06/04/2018    HDL 82 (H) 02/19/2018     Lab Results   Component Value Date    LDLCALC 53 02/22/2019    LDLCALC 44 06/04/2018    LDLCALC 146 (H) 02/19/2018     Lab Results   Component Value Date    LABVLDL 17 02/22/2019    LABVLDL 11 06/04/2018    LABVLDL 27 02/19/2018     No results found for: [facility]    Chemistry        Component Value Date/Time     02/22/2019 1111    K 4.8 02/22/2019 1111     02/22/2019 1111    CO2 26 02/22/2019 1111    BUN 16 02/22/2019 1111    CREATININE 0.6 02/22/2019 1111        Component Value Date/Time    CALCIUM 9.8 02/22/2019 1111    ALKPHOS 73 02/22/2019 1111    AST 26 02/22/2019 1111    ALT 30 02/22/2019 1111    BILITOT 0.5 02/22/2019 1111        Lab Results   Component Value Date    LABA1C 5.8 02/22/2019     Lab Results   Component Value Date    .8 02/22/2019     Lab Results   Component Value Date    TSH 0.76 02/22/2019         Review of Systems   Constitutional: Negative. Negative for activity change, appetite change, chills, diaphoresis, fatigue, fever and unexpected weight change. Good health  Exercise  Weight lifting  She did work on weight loss  + weight loss   HENT: Negative. Negative for congestion, ear discharge, ear pain, hearing loss, nosebleeds, postnasal drip, rhinorrhea, sinus pressure, sinus pain, sore throat, tinnitus, trouble swallowing and voice change. Eyes: Negative. Negative for photophobia, pain, discharge, redness, itching and visual disturbance. Respiratory: Negative. Negative for apnea, cough, choking, chest tightness, shortness of breath and wheezing. Cardiovascular: Negative for chest pain, palpitations and leg swelling.    Gastrointestinal: Negative for abdominal distention, abdominal pain, blood in stool,

## 2020-02-25 LAB
ESTIMATED AVERAGE GLUCOSE: 99.7 MG/DL
HBA1C MFR BLD: 5.1 %

## 2020-02-29 ENCOUNTER — PATIENT MESSAGE (OUTPATIENT)
Dept: PRIMARY CARE CLINIC | Age: 55
End: 2020-02-29

## 2020-03-02 RX ORDER — ZOLPIDEM TARTRATE 5 MG/1
5 TABLET ORAL NIGHTLY PRN
Qty: 30 TABLET | Refills: 0 | Status: SHIPPED | OUTPATIENT
Start: 2020-03-02 | End: 2020-04-01

## 2020-03-02 NOTE — TELEPHONE ENCOUNTER
From: Tha Boudreaux  To: Love Altman MD  Sent: 2/29/2020 7:59 PM EST  Subject: Prescription Question    Can you please refill zolpidem tartrate 5mg.   Thank you,  Tah Boudreaux

## 2020-05-07 ENCOUNTER — PATIENT MESSAGE (OUTPATIENT)
Dept: PRIMARY CARE CLINIC | Age: 55
End: 2020-05-07

## 2020-05-08 RX ORDER — ZOLPIDEM TARTRATE 5 MG/1
5 TABLET ORAL NIGHTLY PRN
Qty: 30 TABLET | Refills: 0 | Status: SHIPPED | OUTPATIENT
Start: 2020-05-08 | End: 2020-06-07

## 2020-05-08 NOTE — TELEPHONE ENCOUNTER
From: Chinmay Goldberg  To: Amor Bhatia MD  Sent: 5/7/2020 6:27 PM EDT  Subject: Prescription Question    Can I please have a refill on Zolpidem?   Thank you

## 2020-06-04 ENCOUNTER — NURSE ONLY (OUTPATIENT)
Dept: PRIMARY CARE CLINIC | Age: 55
End: 2020-06-04

## 2020-06-04 ENCOUNTER — TELEPHONE (OUTPATIENT)
Dept: PRIMARY CARE CLINIC | Age: 55
End: 2020-06-04

## 2020-06-04 RX ORDER — ROSUVASTATIN CALCIUM 10 MG/1
TABLET, COATED ORAL
Qty: 90 TABLET | Refills: 1 | Status: SHIPPED | OUTPATIENT
Start: 2020-06-04 | End: 2020-10-21

## 2020-06-04 RX ORDER — ZOLPIDEM TARTRATE 5 MG/1
5 TABLET ORAL NIGHTLY PRN
Qty: 30 TABLET | Refills: 0 | OUTPATIENT
Start: 2020-06-04 | End: 2020-07-04

## 2020-06-04 RX ORDER — LISINOPRIL 5 MG/1
5 TABLET ORAL DAILY
Qty: 90 TABLET | Refills: 1 | Status: SHIPPED | OUTPATIENT
Start: 2020-06-04 | End: 2020-10-21

## 2020-06-04 NOTE — TELEPHONE ENCOUNTER
[facility] received a call from: [address]    Name of Caller:same    Relationship to patient:self    Organization name: n/a    Best contact number: cell    Reason for call: Callpatient to schedule an appt for her 2nd Shingles vaccine

## 2020-06-04 NOTE — TELEPHONE ENCOUNTER
Medication:   Requested Prescriptions     Pending Prescriptions Disp Refills    zolpidem (AMBIEN) 5 MG tablet 30 tablet 0     Sig: Take 1 tablet by mouth nightly as needed for Sleep for up to 30 days.  lisinopril (PRINIVIL;ZESTRIL) 5 MG tablet 30 tablet 2     Last Filled:  5.8.20  3.16.20  Last appt: 2/24/2020   Next appt: Visit date not found    Pt states she is no longer taking Zolpidem. Last OARRS: No flowsheet data found.

## 2020-06-11 RX ORDER — LISINOPRIL 5 MG/1
TABLET ORAL
Qty: 30 TABLET | Refills: 1 | OUTPATIENT
Start: 2020-06-11

## 2020-06-11 NOTE — TELEPHONE ENCOUNTER
Medication:   Requested Prescriptions     Pending Prescriptions Disp Refills    lisinopril (PRINIVIL;ZESTRIL) 5 MG tablet [Pharmacy Med Name: LISINOPRIL 5 MG TABLET] 30 tablet 1     Sig: TAKE ONE TABLET BY MOUTH DAILY       Last Filled:      Patient Phone Number: [phone] (home)     Last appt: 2/24/2020   Next appt: Visit date not found    Last BMP:   Lab Results   Component Value Date     02/24/2020    K 5.5 02/24/2020     02/24/2020    CO2 25 02/24/2020    ANIONGAP 15 02/24/2020    GLUCOSE 91 02/24/2020    BUN 18 02/24/2020    CREATININE 0.9 02/24/2020    LABGLOM >60 02/24/2020    GFRAA >60 02/24/2020    GFRAA >60 10/08/2012    CALCIUM 10.0 02/24/2020      Last CMP:   Lab Results   Component Value Date     02/24/2020    K 5.5 02/24/2020     02/24/2020    CO2 25 02/24/2020    ANIONGAP 15 02/24/2020    GLUCOSE 91 02/24/2020    BUN 18 02/24/2020    CREATININE 0.9 02/24/2020    LABGLOM >60 02/24/2020    GFRAA >60 02/24/2020    GFRAA >60 10/08/2012    PROT 7.0 02/24/2020    PROT 7.8 10/08/2012    LABALBU 4.6 02/24/2020    AGRATIO 1.9 02/24/2020    BILITOT 0.5 02/24/2020    ALKPHOS 89 02/24/2020    ALT 17 02/24/2020    AST 26 02/24/2020    GLOB 2.4 02/24/2020     Last Renal Function:   Lab Results   Component Value Date     02/24/2020    K 5.5 02/24/2020     02/24/2020    CO2 25 02/24/2020    GLUCOSE 91 02/24/2020    BUN 18 02/24/2020    CREATININE 0.9 02/24/2020    LABALBU 4.6 02/24/2020    CALCIUM 10.0 02/24/2020    GFR >60 10/08/2012    GFRAA >60 02/24/2020    GFRAA >60 10/08/2012       Last OARRS: No flowsheet data found.     Preferred Pharmacy:   [facility] [address]  Phone: [phone] Fax: [phone]    [facility] [address], [address] [place] [address] Street [phone] [facility] [phone]  [facility] [address]  Phone:

## 2021-01-29 ENCOUNTER — OFFICE VISIT (OUTPATIENT)
Dept: PRIMARY CARE CLINIC | Age: 56
End: 2021-01-29
Payer: COMMERCIAL

## 2021-01-29 VITALS
HEART RATE: 67 BPM | SYSTOLIC BLOOD PRESSURE: 114 MMHG | WEIGHT: 128.8 LBS | DIASTOLIC BLOOD PRESSURE: 68 MMHG | TEMPERATURE: 98.4 F | BODY MASS INDEX: 20.7 KG/M2 | OXYGEN SATURATION: 98 % | HEIGHT: 66 IN

## 2021-01-29 DIAGNOSIS — E78.9 LIPID DISORDER: ICD-10-CM

## 2021-01-29 DIAGNOSIS — Z00.00 ROUTINE PHYSICAL EXAMINATION: ICD-10-CM

## 2021-01-29 DIAGNOSIS — E78.00 HYPERCHOLESTEREMIA: ICD-10-CM

## 2021-01-29 DIAGNOSIS — I10 ESSENTIAL HYPERTENSION: ICD-10-CM

## 2021-01-29 DIAGNOSIS — Z13.1 DIABETES MELLITUS SCREENING: ICD-10-CM

## 2021-01-29 DIAGNOSIS — Z00.00 ROUTINE PHYSICAL EXAMINATION: Primary | ICD-10-CM

## 2021-01-29 DIAGNOSIS — J34.9 SINUS DISORDER: ICD-10-CM

## 2021-01-29 DIAGNOSIS — R00.2 PALPITATION: ICD-10-CM

## 2021-01-29 LAB
A/G RATIO: 1.8 (ref 1.1–2.2)
ALBUMIN SERPL-MCNC: 4.4 G/DL (ref 3.4–5)
ALP BLD-CCNC: 84 U/L (ref 40–129)
ALT SERPL-CCNC: 18 U/L (ref 10–40)
ANION GAP SERPL CALCULATED.3IONS-SCNC: 11 MMOL/L (ref 3–16)
AST SERPL-CCNC: 27 U/L (ref 15–37)
BASOPHILS ABSOLUTE: 0 K/UL (ref 0–0.2)
BASOPHILS RELATIVE PERCENT: 0.6 %
BILIRUB SERPL-MCNC: 0.4 MG/DL (ref 0–1)
BUN BLDV-MCNC: 20 MG/DL (ref 7–20)
C-REACTIVE PROTEIN, HIGH SENSITIVITY: 0.96 MG/L (ref 0.16–3)
CALCIUM SERPL-MCNC: 9.4 MG/DL (ref 8.3–10.6)
CHLORIDE BLD-SCNC: 105 MMOL/L (ref 99–110)
CHOLESTEROL, TOTAL: 124 MG/DL (ref 0–199)
CO2: 25 MMOL/L (ref 21–32)
CREAT SERPL-MCNC: 0.7 MG/DL (ref 0.6–1.1)
EOSINOPHILS ABSOLUTE: 0.2 K/UL (ref 0–0.6)
EOSINOPHILS RELATIVE PERCENT: 3.4 %
GFR AFRICAN AMERICAN: >60
GFR NON-AFRICAN AMERICAN: >60
GLOBULIN: 2.5 G/DL
GLUCOSE BLD-MCNC: 87 MG/DL (ref 70–99)
HCT VFR BLD CALC: 40.4 % (ref 36–48)
HDLC SERPL-MCNC: 73 MG/DL (ref 40–60)
HEMOGLOBIN: 13.2 G/DL (ref 12–16)
LDL CHOLESTEROL CALCULATED: 37 MG/DL
LYMPHOCYTES ABSOLUTE: 1.9 K/UL (ref 1–5.1)
LYMPHOCYTES RELATIVE PERCENT: 33.7 %
MCH RBC QN AUTO: 30 PG (ref 26–34)
MCHC RBC AUTO-ENTMCNC: 32.8 G/DL (ref 31–36)
MCV RBC AUTO: 91.3 FL (ref 80–100)
MONOCYTES ABSOLUTE: 0.4 K/UL (ref 0–1.3)
MONOCYTES RELATIVE PERCENT: 7.6 %
NEUTROPHILS ABSOLUTE: 3.1 K/UL (ref 1.7–7.7)
NEUTROPHILS RELATIVE PERCENT: 54.7 %
PDW BLD-RTO: 13.9 % (ref 12.4–15.4)
PLATELET # BLD: 150 K/UL (ref 135–450)
PMV BLD AUTO: 10 FL (ref 5–10.5)
POTASSIUM SERPL-SCNC: 4.2 MMOL/L (ref 3.5–5.1)
RBC # BLD: 4.42 M/UL (ref 4–5.2)
SODIUM BLD-SCNC: 141 MMOL/L (ref 136–145)
TOTAL PROTEIN: 6.9 G/DL (ref 6.4–8.2)
TRIGL SERPL-MCNC: 69 MG/DL (ref 0–150)
TSH SERPL DL<=0.05 MIU/L-ACNC: 1.56 UIU/ML (ref 0.27–4.2)
VLDLC SERPL CALC-MCNC: 14 MG/DL
WBC # BLD: 5.7 K/UL (ref 4–11)

## 2021-01-29 PROCEDURE — 93000 ELECTROCARDIOGRAM COMPLETE: CPT | Performed by: FAMILY MEDICINE

## 2021-01-29 PROCEDURE — 99396 PREV VISIT EST AGE 40-64: CPT | Performed by: FAMILY MEDICINE

## 2021-01-29 RX ORDER — LISINOPRIL 5 MG/1
TABLET ORAL
Qty: 90 TABLET | Refills: 2 | Status: SHIPPED | OUTPATIENT
Start: 2021-01-29 | End: 2021-10-11 | Stop reason: SDUPTHER

## 2021-01-29 RX ORDER — ROSUVASTATIN CALCIUM 10 MG/1
TABLET, COATED ORAL
Qty: 90 TABLET | Refills: 2 | Status: SHIPPED | OUTPATIENT
Start: 2021-01-29 | End: 2021-10-11 | Stop reason: SDUPTHER

## 2021-01-29 ASSESSMENT — ENCOUNTER SYMPTOMS
PHOTOPHOBIA: 0
EYE PAIN: 0
VOMITING: 0
SORE THROAT: 1
WHEEZING: 0
CONSTIPATION: 0
ABDOMINAL PAIN: 0
COLOR CHANGE: 0
CHOKING: 0
DIARRHEA: 0
RESPIRATORY NEGATIVE: 1
EYE DISCHARGE: 0
EYE REDNESS: 0
BLOOD IN STOOL: 0
COUGH: 0
TROUBLE SWALLOWING: 0
SHORTNESS OF BREATH: 0
ABDOMINAL DISTENTION: 0
EYE ITCHING: 0
SINUS PAIN: 0
APNEA: 0
NAUSEA: 0
CHEST TIGHTNESS: 0
VOICE CHANGE: 0
EYES NEGATIVE: 1
RHINORRHEA: 0
BACK PAIN: 0
SINUS PRESSURE: 1

## 2021-01-29 ASSESSMENT — PATIENT HEALTH QUESTIONNAIRE - PHQ9
1. LITTLE INTEREST OR PLEASURE IN DOING THINGS: 0
SUM OF ALL RESPONSES TO PHQ QUESTIONS 1-9: 0

## 2021-01-29 NOTE — PROGRESS NOTES
SUBJECTIVE:  Patient ID: Margarette Epps is a 50 y.o. female. Chief Complaint:  Chief Complaint   Patient presents with    Annual Exam       HPI   50year old Female  Annual  Hypertension compliant with Rx No side effect  Lipid disorder compliant with Rx No side effect  Brother recent +stent @age 64    Past Medical History:   Diagnosis Date    Hypercholesteremia     Hypertension      Past Surgical History:   Procedure Laterality Date    KNEE SURGERY Right 02/14/2019    [place]    SARKIS AND BSO  1996    FHx Mother +ovarian cancer    THYROIDECTOMY, PARTIAL  2005    Nodule ,Benign Lt     Allergies   Allergen Reactions    Minocycline Swelling     Throat closes up     Family History   Problem Relation Age of Onset    Cancer Mother         ovarian cancer @age 54    Cancer Father         Now 77 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   [address] [place] [address] Brother         Stent @age 62year old     Social History     Social History Narrative         FT job    2 children 27 & 32    4 G children    Stay Home     Horses       Patient Active Problem List   Diagnosis    Hypertension    Eczema    Family history of ovarian cancer    Hypercholesteremia    Vitamin D deficiency    Pityriasis lichenoides     Current Outpatient Medications   Medication Sig Dispense Refill    lisinopril (PRINIVIL;ZESTRIL) 5 MG tablet TAKE 1 TABLET DAILY 90 tablet 0    rosuvastatin (CRESTOR) 10 MG tablet TAKE 1 TABLET DAILY 90 tablet 0    Naproxen Sodium (ALEVE PO) Take by mouth 2 times daily      Cholecalciferol (VITAMIN D) 2000 units CAPS capsule Take by mouth      aspirin 81 MG tablet Take 81 mg by mouth daily       No current facility-administered medications for this visit.       Lab Results   Component Value Date    WBC 7.5 02/24/2020    HGB 13.8 02/24/2020    HCT 41.2 02/24/2020    MCV 92.0 02/24/2020     02/24/2020     Lab Results   Component Value Date    CHOL 129 02/24/2020    CHOL 148 02/22/2019    CHOL 142 06/04/2018     Lab Results   Component Value Date    TRIG 75 02/24/2020    TRIG 87 02/22/2019    TRIG 57 06/04/2018     Lab Results   Component Value Date    HDL 78 (H) 02/24/2020    HDL 78 (H) 02/22/2019    HDL 87 (H) 06/04/2018     Lab Results   Component Value Date    LDLCALC 36 02/24/2020    LDLCALC 53 02/22/2019    LDLCALC 44 06/04/2018     Lab Results   Component Value Date    LABVLDL 15 02/24/2020    LABVLDL 17 02/22/2019    LABVLDL 11 06/04/2018     No results found for: [facility]    Chemistry        Component Value Date/Time     02/24/2020 0954    K 5.5 (H) 02/24/2020 0954     02/24/2020 0954    CO2 25 02/24/2020 0954    BUN 18 02/24/2020 0954    CREATININE 0.9 02/24/2020 0954        Component Value Date/Time    CALCIUM 10.0 02/24/2020 0954    ALKPHOS 89 02/24/2020 0954    AST 26 02/24/2020 0954    ALT 17 02/24/2020 0954    BILITOT 0.5 02/24/2020 0954            Review of Systems   Constitutional: Negative for activity change, appetite change, chills, diaphoresis, fatigue, fever and unexpected weight change. Bad year  covid   Allergy bad  Hippa filter did help   HENT: Positive for congestion, sinus pressure and sore throat. Negative for ear discharge, ear pain, hearing loss, nosebleeds, postnasal drip, rhinorrhea, sinus pain, tinnitus, trouble swallowing and voice change. Allergy  OTC Sinus   OTC allergy  Ice face mask  Whole head  Today she feels   Covid test done once after [place] Negative  Nose stuffy   Eyes: Negative. Negative for photophobia, pain, discharge, redness, itching and visual disturbance. Respiratory: Negative. Negative for apnea, cough, choking, chest tightness, shortness of breath and wheezing. Cardiovascular: Positive for chest pain and palpitations. Negative for leg swelling. This year   Over stress   Gastrointestinal: Negative for abdominal distention, abdominal pain, blood in stool, constipation, diarrhea, nausea and vomiting.         BM is issue  Metamucil + Smoothie vegetable   Miralax 3 x/week  Colonoscopy done @age 52   Endocrine: Negative for cold intolerance, heat intolerance, polydipsia, polyphagia and polyuria. Genitourinary: Negative for dysuria, flank pain, frequency and urgency. Menopause @age 46  TAHBSO  Fhx Mother +Ovarian cancer   Musculoskeletal: Negative for back pain, gait problem and neck pain. Weight training   Skin: Negative for color change and rash. Allergic/Immunologic: Positive for environmental allergies. Negative for food allergies. Bad allergy this year   Neurological: Positive for dizziness and headaches. Negative for tremors, light-headedness and numbness. Allergy    Psychiatric/Behavioral: Negative for agitation, behavioral problems, decreased concentration and sleep disturbance. The patient is not hyperactive.  work from home since March 2020  Rx Ambien rare use very sporadic  Total 6 children blended family  4 G baby +one coming       OBJECTIVE:  /68 (Site: Right Upper Arm, Position: Sitting, Cuff Size: Medium Adult)   Pulse 67   Temp 98.4 °F (36.9 °C) (Infrared)   Ht 5' 6\" (1.676 m)   Wt 128 lb 12.8 oz (58.4 kg)   LMP 05/09/2007   SpO2 98%   BMI 20.79 kg/m²   Physical Exam  Constitutional:       General: She is not in acute distress. Appearance: She is well-developed. She is not diaphoretic. HENT:      Head: Normocephalic. Right Ear: External ear normal.      Left Ear: External ear normal.      Nose: Nose normal.   Eyes:      General: No scleral icterus. Right eye: No discharge. Left eye: No discharge. Conjunctiva/sclera: Conjunctivae normal.      Pupils: Pupils are equal, round, and reactive to light. Neck:      Musculoskeletal: Normal range of motion and neck supple. Thyroid: No thyromegaly. Cardiovascular:      Rate and Rhythm: Normal rate and regular rhythm. Heart sounds: Normal heart sounds. No murmur. Comments: EKG WNL  Pulmonary:      Effort: Pulmonary effort is normal. No respiratory distress. Breath sounds: Normal breath sounds. No wheezing or rales. Chest:      Chest wall: No tenderness. Abdominal:      General: Bowel sounds are normal. There is no distension. Palpations: Abdomen is soft. There is no mass. Tenderness: There is no abdominal tenderness. There is no guarding or rebound. Musculoskeletal: Normal range of motion. Lymphadenopathy:      Cervical: No cervical adenopathy. Skin:     General: Skin is warm. Findings: No rash. Neurological:      Mental Status: She is alert and oriented to person, place, and time. Deep Tendon Reflexes: Reflexes are normal and symmetric. Psychiatric:         Behavior: Behavior normal.         Thought Content: Thought content normal.         Judgment: Judgment normal.         ASSESSMENT/PLAN:      Diagnosis Orders   1. Routine physical examination  CBC Auto Differential    Comprehensive Metabolic Panel    Hemoglobin A1C    Lipid Panel    TSH without Reflex    CRP,High Sensitivity   2. Sinus disorder  Vishal Henriquez MD, Otolaryngology, [facility]   3. Palpitation  EKG 12 Lead   4. Lipid disorder  CT CARDIAC CALCIUM SCORING    Lipid Panel    CRP,High Sensitivity   5. Essential hypertension  CT CARDIAC CALCIUM SCORING    lisinopril (PRINIVIL;ZESTRIL) 5 MG tablet   6. Diabetes mellitus screening  Hemoglobin A1C   7.  Hypercholesteremia  rosuvastatin (CRESTOR) 10 MG tablet       As above   Flu shot 10-1-2020

## 2021-01-30 LAB
ESTIMATED AVERAGE GLUCOSE: 105.4 MG/DL
HBA1C MFR BLD: 5.3 %

## 2021-02-10 PROBLEM — K76.89 LIVER CYST: Status: ACTIVE | Noted: 2021-02-10

## 2021-02-11 DIAGNOSIS — K76.89 LIVER CYST: Primary | ICD-10-CM

## 2021-02-15 ENCOUNTER — HOSPITAL ENCOUNTER (OUTPATIENT)
Dept: ULTRASOUND IMAGING | Age: 56
Discharge: HOME | End: 2021-02-15
Payer: OTHER GOVERNMENT

## 2021-02-15 DIAGNOSIS — K76.89 LIVER CYST: ICD-10-CM

## 2021-02-15 PROCEDURE — 76700 US EXAM ABDOM COMPLETE: CPT

## 2021-02-17 DIAGNOSIS — N28.9 LESION OF RIGHT NATIVE KIDNEY: Primary | ICD-10-CM

## 2021-02-26 PROBLEM — D17.71 ANGIOMYOLIPOMA OF RIGHT KIDNEY: Status: ACTIVE | Noted: 2021-02-26

## 2021-07-12 DIAGNOSIS — L98.9 SKIN DISORDER: Primary | ICD-10-CM

## 2021-10-11 DIAGNOSIS — I10 ESSENTIAL HYPERTENSION: ICD-10-CM

## 2021-10-11 DIAGNOSIS — E78.00 HYPERCHOLESTEREMIA: ICD-10-CM

## 2021-10-11 RX ORDER — ROSUVASTATIN CALCIUM 10 MG/1
TABLET, COATED ORAL
Qty: 90 TABLET | Refills: 2 | Status: SHIPPED | OUTPATIENT
Start: 2021-10-11 | End: 2022-06-01

## 2021-10-11 RX ORDER — LISINOPRIL 5 MG/1
TABLET ORAL
Qty: 90 TABLET | Refills: 2 | Status: SHIPPED | OUTPATIENT
Start: 2021-10-11 | End: 2022-06-01

## 2021-10-11 NOTE — TELEPHONE ENCOUNTER
Requested Prescriptions     Pending Prescriptions Disp Refills    lisinopril (PRINIVIL;ZESTRIL) 5 MG tablet 90 tablet 2     Sig: TAKE 1 TABLET DAILY    rosuvastatin (CRESTOR) 10 MG tablet 90 tablet 2     Sig: TAKE 1 TABLET DAILY     Last visit: 01/29/2021   Next ov: none

## 2022-01-31 ENCOUNTER — OFFICE VISIT (OUTPATIENT)
Dept: PRIMARY CARE CLINIC | Age: 57
End: 2022-01-31
Payer: OTHER GOVERNMENT

## 2022-01-31 VITALS
WEIGHT: 138.2 LBS | SYSTOLIC BLOOD PRESSURE: 112 MMHG | OXYGEN SATURATION: 99 % | BODY MASS INDEX: 22.21 KG/M2 | HEART RATE: 84 BPM | DIASTOLIC BLOOD PRESSURE: 72 MMHG | TEMPERATURE: 97.9 F | HEIGHT: 66 IN

## 2022-01-31 DIAGNOSIS — Z11.59 ENCOUNTER FOR HCV SCREENING TEST FOR LOW RISK PATIENT: ICD-10-CM

## 2022-01-31 DIAGNOSIS — E78.00 HYPERCHOLESTEREMIA: ICD-10-CM

## 2022-01-31 DIAGNOSIS — K76.89 LIVER CYST: ICD-10-CM

## 2022-01-31 DIAGNOSIS — Z00.00 ROUTINE PHYSICAL EXAMINATION: Primary | ICD-10-CM

## 2022-01-31 DIAGNOSIS — I10 PRIMARY HYPERTENSION: ICD-10-CM

## 2022-01-31 DIAGNOSIS — J30.1 SEASONAL ALLERGIC RHINITIS DUE TO POLLEN: ICD-10-CM

## 2022-01-31 DIAGNOSIS — Z00.00 ROUTINE PHYSICAL EXAMINATION: ICD-10-CM

## 2022-01-31 DIAGNOSIS — L98.9 SKIN DISORDER: ICD-10-CM

## 2022-01-31 LAB
A/G RATIO: 2 (ref 1.1–2.2)
ALBUMIN SERPL-MCNC: 4.7 G/DL (ref 3.4–5)
ALP BLD-CCNC: 84 U/L (ref 40–129)
ALT SERPL-CCNC: 29 U/L (ref 10–40)
ANION GAP SERPL CALCULATED.3IONS-SCNC: 15 MMOL/L (ref 3–16)
AST SERPL-CCNC: 34 U/L (ref 15–37)
BASOPHILS ABSOLUTE: 0 K/UL (ref 0–0.2)
BASOPHILS RELATIVE PERCENT: 0.8 %
BILIRUB SERPL-MCNC: 0.5 MG/DL (ref 0–1)
BUN BLDV-MCNC: 19 MG/DL (ref 7–20)
CALCIUM SERPL-MCNC: 9.7 MG/DL (ref 8.3–10.6)
CHLORIDE BLD-SCNC: 104 MMOL/L (ref 99–110)
CHOLESTEROL, TOTAL: 134 MG/DL (ref 0–199)
CO2: 24 MMOL/L (ref 21–32)
CREAT SERPL-MCNC: 0.9 MG/DL (ref 0.6–1.1)
EOSINOPHILS ABSOLUTE: 0.1 K/UL (ref 0–0.6)
EOSINOPHILS RELATIVE PERCENT: 2.3 %
GFR AFRICAN AMERICAN: >60
GFR NON-AFRICAN AMERICAN: >60
GLUCOSE BLD-MCNC: 84 MG/DL (ref 70–99)
HCT VFR BLD CALC: 42.8 % (ref 36–48)
HDLC SERPL-MCNC: 69 MG/DL (ref 40–60)
HEMOGLOBIN: 14.3 G/DL (ref 12–16)
HEPATITIS C ANTIBODY INTERPRETATION: NORMAL
LDL CHOLESTEROL CALCULATED: 50 MG/DL
LYMPHOCYTES ABSOLUTE: 1.8 K/UL (ref 1–5.1)
LYMPHOCYTES RELATIVE PERCENT: 31.1 %
MCH RBC QN AUTO: 30.5 PG (ref 26–34)
MCHC RBC AUTO-ENTMCNC: 33.5 G/DL (ref 31–36)
MCV RBC AUTO: 91.1 FL (ref 80–100)
MONOCYTES ABSOLUTE: 0.5 K/UL (ref 0–1.3)
MONOCYTES RELATIVE PERCENT: 8.3 %
NEUTROPHILS ABSOLUTE: 3.3 K/UL (ref 1.7–7.7)
NEUTROPHILS RELATIVE PERCENT: 57.5 %
PDW BLD-RTO: 13.5 % (ref 12.4–15.4)
PLATELET # BLD: 152 K/UL (ref 135–450)
PMV BLD AUTO: 9.1 FL (ref 5–10.5)
POTASSIUM SERPL-SCNC: 5.4 MMOL/L (ref 3.5–5.1)
RBC # BLD: 4.69 M/UL (ref 4–5.2)
SODIUM BLD-SCNC: 143 MMOL/L (ref 136–145)
TOTAL PROTEIN: 7.1 G/DL (ref 6.4–8.2)
TRIGL SERPL-MCNC: 76 MG/DL (ref 0–150)
TSH SERPL DL<=0.05 MIU/L-ACNC: 2.31 UIU/ML (ref 0.27–4.2)
VLDLC SERPL CALC-MCNC: 15 MG/DL
WBC # BLD: 5.6 K/UL (ref 4–11)

## 2022-01-31 PROCEDURE — 99396 PREV VISIT EST AGE 40-64: CPT | Performed by: FAMILY MEDICINE

## 2022-01-31 SDOH — ECONOMIC STABILITY: HOUSING INSECURITY: IN THE LAST 12 MONTHS, HOW MANY PLACES HAVE YOU LIVED?: 1

## 2022-01-31 SDOH — ECONOMIC STABILITY: INCOME INSECURITY: IN THE LAST 12 MONTHS, WAS THERE A TIME WHEN YOU WERE NOT ABLE TO PAY THE MORTGAGE OR RENT ON TIME?: NO

## 2022-01-31 SDOH — HEALTH STABILITY: PHYSICAL HEALTH: ON AVERAGE, HOW MANY MINUTES DO YOU ENGAGE IN EXERCISE AT THIS LEVEL?: 40 MIN

## 2022-01-31 SDOH — HEALTH STABILITY: PHYSICAL HEALTH: ON AVERAGE, HOW MANY DAYS PER WEEK DO YOU ENGAGE IN MODERATE TO STRENUOUS EXERCISE (LIKE A BRISK WALK)?: 5 DAYS

## 2022-01-31 SDOH — ECONOMIC STABILITY: FOOD INSECURITY: WITHIN THE PAST 12 MONTHS, YOU WORRIED THAT YOUR FOOD WOULD RUN OUT BEFORE YOU GOT MONEY TO BUY MORE.: NEVER TRUE

## 2022-01-31 SDOH — ECONOMIC STABILITY: FOOD INSECURITY: WITHIN THE PAST 12 MONTHS, THE FOOD YOU BOUGHT JUST DIDN'T LAST AND YOU DIDN'T HAVE MONEY TO GET MORE.: NEVER TRUE

## 2022-01-31 SDOH — ECONOMIC STABILITY: HOUSING INSECURITY
IN THE LAST 12 MONTHS, WAS THERE A TIME WHEN YOU DID NOT HAVE A STEADY PLACE TO SLEEP OR SLEPT IN A SHELTER (INCLUDING NOW)?: NO

## 2022-01-31 ASSESSMENT — ENCOUNTER SYMPTOMS
WHEEZING: 0
SHORTNESS OF BREATH: 0
BACK PAIN: 0
APNEA: 0
RESPIRATORY NEGATIVE: 1
EYES NEGATIVE: 1

## 2022-01-31 ASSESSMENT — SOCIAL DETERMINANTS OF HEALTH (SDOH)
DO YOU BELONG TO ANY CLUBS OR ORGANIZATIONS SUCH AS CHURCH GROUPS UNIONS, FRATERNAL OR ATHLETIC GROUPS, OR SCHOOL GROUPS?: NO
HOW OFTEN DO YOU ATTENT MEETINGS OF THE CLUB OR ORGANIZATION YOU BELONG TO?: NEVER
IN A TYPICAL WEEK, HOW MANY TIMES DO YOU TALK ON THE PHONE WITH FAMILY, FRIENDS, OR NEIGHBORS?: MORE THAN THREE TIMES A WEEK
HOW OFTEN DO YOU ATTEND CHURCH OR RELIGIOUS SERVICES?: MORE THAN 4 TIMES PER YEAR
HOW OFTEN DO YOU GET TOGETHER WITH FRIENDS OR RELATIVES?: ONCE A WEEK
HOW HARD IS IT FOR YOU TO PAY FOR THE VERY BASICS LIKE FOOD, HOUSING, MEDICAL CARE, AND HEATING?: NOT HARD AT ALL

## 2022-01-31 ASSESSMENT — PATIENT HEALTH QUESTIONNAIRE - PHQ9
1. LITTLE INTEREST OR PLEASURE IN DOING THINGS: 0
SUM OF ALL RESPONSES TO PHQ QUESTIONS 1-9: 0
2. FEELING DOWN, DEPRESSED OR HOPELESS: 0
SUM OF ALL RESPONSES TO PHQ QUESTIONS 1-9: 0
SUM OF ALL RESPONSES TO PHQ9 QUESTIONS 1 & 2: 0
SUM OF ALL RESPONSES TO PHQ QUESTIONS 1-9: 0
SUM OF ALL RESPONSES TO PHQ QUESTIONS 1-9: 0

## 2022-01-31 ASSESSMENT — LIFESTYLE VARIABLES: HOW OFTEN DO YOU HAVE A DRINK CONTAINING ALCOHOL: NEVER

## 2022-01-31 NOTE — PROGRESS NOTES
SUBJECTIVE:  Patient ID: Jerrell Choi is a 49 y.o. female. Chief Complaint:  Chief Complaint   Patient presents with    Annual Exam       HPI   49year old Female  Annual    Past Medical History:   Diagnosis Date    Hypercholesteremia     Hypertension     Kidney lesion, native, right 2/17/2021     Past Surgical History:   Procedure Laterality Date    KNEE SURGERY Right 02/14/2019    Kaiden Montemayor January    SARKIS AND BSO  1996    FHx Mother +ovarian cancer    THYROIDECTOMY, PARTIAL  2005    Nodule ,Benign Lt     Allergies   Allergen Reactions    Minocycline Swelling     Throat closes up       Family History   Problem Relation Age of Onset    Cancer Mother         ovarian cancer @age 54    Cancer Father         Now 79 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    Coronary Art Dis Father         + recent Stent    Heart Disease Brother         Stent @age 62year old     Social History     Social History Narrative         FT job    2 children 27 & 32    4 G children    Stay Home     Horses       Patient Active Problem List   Diagnosis    Hypertension    Eczema    Family history of ovarian cancer    Hypercholesteremia    Vitamin D deficiency    Pityriasis lichenoides    Liver cyst    Kidney lesion, native, right    Angiomyolipoma of right kidney     Current Outpatient Medications   Medication Sig Dispense Refill    lisinopril (PRINIVIL;ZESTRIL) 5 MG tablet TAKE 1 TABLET DAILY 90 tablet 2    rosuvastatin (CRESTOR) 10 MG tablet TAKE 1 TABLET DAILY 90 tablet 2    Naproxen Sodium (ALEVE PO) Take by mouth 2 times daily      Cholecalciferol (VITAMIN D) 2000 units CAPS capsule Take by mouth      aspirin 81 MG tablet Take 81 mg by mouth daily       No current facility-administered medications for this visit.      Lab Results   Component Value Date    WBC 5.7 01/29/2021    HGB 13.2 01/29/2021    HCT 40.4 01/29/2021    MCV 91.3 01/29/2021     01/29/2021     Lab Results   Component Value Date CHOL 124 01/29/2021    CHOL 129 02/24/2020    CHOL 148 02/22/2019     Lab Results   Component Value Date    TRIG 69 01/29/2021    TRIG 75 02/24/2020    TRIG 87 02/22/2019     Lab Results   Component Value Date    HDL 73 (H) 01/29/2021    HDL 78 (H) 02/24/2020    HDL 78 (H) 02/22/2019     Lab Results   Component Value Date    LDLCALC 37 01/29/2021    LDLCALC 36 02/24/2020    LDLCALC 53 02/22/2019     Lab Results   Component Value Date    LABVLDL 14 01/29/2021    LABVLDL 15 02/24/2020    LABVLDL 17 02/22/2019     No results found for: [facility]    Chemistry        Component Value Date/Time     01/29/2021 1015    K 4.2 01/29/2021 1015     01/29/2021 1015    CO2 25 01/29/2021 1015    BUN 20 01/29/2021 1015    CREATININE 0.7 01/29/2021 1015        Component Value Date/Time    CALCIUM 9.4 01/29/2021 1015    ALKPHOS 84 01/29/2021 1015    AST 27 01/29/2021 1015    ALT 18 01/29/2021 1015    BILITOT 0.4 01/29/2021 1015            Review of Systems   Constitutional:        Over all good health  active   HENT: Negative. Eyes: Negative. Respiratory: Negative. Negative for apnea, shortness of breath and wheezing. Gastrointestinal:        Colonoscopy done @age 52  Hx liver cyst on CT scan   Endocrine: Negative. Genitourinary: Negative for dysuria and frequency. GYN   SARKIS BSO  Mammogram annual due March 2022  Angiomyelolipoma Rt Kidney    Musculoskeletal: Negative for back pain, gait problem and neck pain. RT arm pain off/on x 1-2 year  Sleep on Rt side  Clicking Rt shoulder off/on   Skin:        Dermatology   [facility]   Allergic/Immunologic: Positive for environmental allergies. OTC prn  Cold mask prn   Neurological: Positive for headaches. Negative for dizziness. Headache off/on use cold pack cover whole head  Weather or stress or allergy related usually @Home  Towards night   Avoid caffeine  Excedrin Migraine during day with good result   Hematological: Negative. Psychiatric/Behavioral: Positive for sleep disturbance. Negative for behavioral problems, confusion, self-injury and suicidal ideas. The patient is not nervous/anxious and is not hyperactive. Trouble sleep   Sleep aid from [place] 29 year moved with them he will move out for New job       OBJECTIVE:  /72 (Site: Right Upper Arm, Position: Sitting, Cuff Size: Medium Adult)   Pulse 84   Temp 97.9 °F (36.6 °C) (Infrared)   Ht 5' 6\" (1.676 m)   Wt 138 lb 3.2 oz (62.7 kg)   LMP 05/09/2007   SpO2 99%   BMI 22.31 kg/m²   Physical Exam  Constitutional:       General: She is not in acute distress. Appearance: She is well-developed. She is not diaphoretic. HENT:      Head: Normocephalic. Right Ear: External ear normal.      Left Ear: External ear normal.      Nose: Nose normal.   Eyes:      General: No scleral icterus. Right eye: No discharge. Left eye: No discharge. Conjunctiva/sclera: Conjunctivae normal.      Pupils: Pupils are equal, round, and reactive to light. Neck:      Thyroid: No thyromegaly. Cardiovascular:      Rate and Rhythm: Normal rate and regular rhythm. Heart sounds: Normal heart sounds. No murmur heard. Pulmonary:      Effort: Pulmonary effort is normal. No respiratory distress. Breath sounds: Normal breath sounds. No wheezing or rales. Chest:      Chest wall: No tenderness. Abdominal:      General: Bowel sounds are normal. There is no distension. Palpations: Abdomen is soft. There is no mass. Tenderness: There is no abdominal tenderness. There is no guarding or rebound. Musculoskeletal:         General: Normal range of motion. Cervical back: Normal range of motion and neck supple. Comments: mild edema lower leg   Lymphadenopathy:      Cervical: No cervical adenopathy. Skin:     General: Skin is warm. Findings: No rash.    Neurological:      Mental Status: She is alert and oriented to person, place, and time. Deep Tendon Reflexes: Reflexes are normal and symmetric. Psychiatric:         Behavior: Behavior normal.         Thought Content: Thought content normal.         Judgment: Judgment normal.         ASSESSMENT/PLAN:      Diagnosis Orders   1. Routine physical examination  CBC Auto Differential    Comprehensive Metabolic Panel    Hemoglobin A1C    Lipid Panel    TSH without Reflex   2. Primary hypertension  Comprehensive Metabolic Panel   3. Hypercholesteremia  Lipid Panel   4. Encounter for HCV screening test for low risk patient  Hepatitis C Antibody   5. Skin disorder  External Referral To Dermatology   6. Seasonal allergic rhinitis due to pollen     7.  Liver cyst  Delfino Painting MD, Gastroenterology, [facility]     Referral as above  Covid Vaccine x 3 done  Flu shot done

## 2022-02-01 LAB
ESTIMATED AVERAGE GLUCOSE: 102.5 MG/DL
HBA1C MFR BLD: 5.2 %

## 2022-05-31 DIAGNOSIS — I10 ESSENTIAL HYPERTENSION: ICD-10-CM

## 2022-05-31 DIAGNOSIS — E78.00 HYPERCHOLESTEREMIA: ICD-10-CM

## 2022-06-01 RX ORDER — LISINOPRIL 5 MG/1
TABLET ORAL
Qty: 90 TABLET | Refills: 1 | Status: SHIPPED | OUTPATIENT
Start: 2022-06-01

## 2022-06-01 RX ORDER — ROSUVASTATIN CALCIUM 10 MG/1
TABLET, COATED ORAL
Qty: 90 TABLET | Refills: 1 | Status: SHIPPED | OUTPATIENT
Start: 2022-06-01

## 2022-06-01 NOTE — TELEPHONE ENCOUNTER
Medication:   Requested Prescriptions     Pending Prescriptions Disp Refills    rosuvastatin (CRESTOR) 10 MG tablet [Pharmacy Med Name: ROSUVASTATIN TABS 10MG] 90 tablet 3     Sig: TAKE 1 TABLET DAILY    lisinopril (PRINIVIL;ZESTRIL) 5 MG tablet [Pharmacy Med Name: LISINOPRIL TABS 5MG] 90 tablet 3     Sig: TAKE 1 TABLET DAILY       Last Filled:      Patient Phone Number: [phone] (home)     Last appt: 1/31/2022   Next appt: Visit date not found    Last BMP:   Lab Results   Component Value Date     01/31/2022    K 5.4 01/31/2022     01/31/2022    CO2 24 01/31/2022    ANIONGAP 15 01/31/2022    GLUCOSE 84 01/31/2022    BUN 19 01/31/2022    CREATININE 0.9 01/31/2022    LABGLOM >60 01/31/2022    GFRAA >60 01/31/2022    GFRAA >60 10/08/2012    CALCIUM 9.7 01/31/2022      Last CMP:   Lab Results   Component Value Date     01/31/2022    K 5.4 01/31/2022     01/31/2022    CO2 24 01/31/2022    ANIONGAP 15 01/31/2022    GLUCOSE 84 01/31/2022    BUN 19 01/31/2022    CREATININE 0.9 01/31/2022    LABGLOM >60 01/31/2022    GFRAA >60 01/31/2022    GFRAA >60 10/08/2012    PROT 7.1 01/31/2022    PROT 7.8 10/08/2012    LABALBU 4.7 01/31/2022    AGRATIO 2.0 01/31/2022    BILITOT 0.5 01/31/2022    ALKPHOS 84 01/31/2022    ALT 29 01/31/2022    AST 34 01/31/2022    GLOB 2.5 01/29/2021     Last Renal Function:   Lab Results   Component Value Date     01/31/2022    K 5.4 01/31/2022     01/31/2022    CO2 24 01/31/2022    GLUCOSE 84 01/31/2022    BUN 19 01/31/2022    CREATININE 0.9 01/31/2022    LABALBU 4.7 01/31/2022    CALCIUM 9.7 01/31/2022    GFR >60 10/08/2012    GFRAA >60 01/31/2022    GFRAA >60 10/08/2012       Last OARRS: No flowsheet data found.     Preferred Pharmacy:   [facility] [id number] - [address]  [address]  Phone: [phone] Fax: [phone] [address], [address] [facility] [address] [address]  Phone: [phone] Fax: [phone]

## 2023-01-30 DIAGNOSIS — I10 ESSENTIAL HYPERTENSION: ICD-10-CM

## 2023-01-30 RX ORDER — LISINOPRIL 5 MG/1
TABLET ORAL
Qty: 90 TABLET | Refills: 0 | Status: SHIPPED | OUTPATIENT
Start: 2023-01-30

## 2023-01-30 NOTE — TELEPHONE ENCOUNTER
Medication:   Requested Prescriptions     Pending Prescriptions Disp Refills    lisinopril (PRINIVIL;ZESTRIL) 5 MG tablet 90 tablet 1     Sig: : TAKE 1 TABLET DAILY       Last Filled:  06/01/22    Patient Phone Number: [phone] (home)     Last appt: 1/31/2022 Physical   Next appt:  02/01/2023    Last BMP:   Lab Results   Component Value Date/Time     01/31/2022 10:25 AM    K 5.4 01/31/2022 10:25 AM     01/31/2022 10:25 AM    CO2 24 01/31/2022 10:25 AM    ANIONGAP 15 01/31/2022 10:25 AM    GLUCOSE 84 01/31/2022 10:25 AM    BUN 19 01/31/2022 10:25 AM    CREATININE 0.9 01/31/2022 10:25 AM    LABGLOM >60 01/31/2022 10:25 AM    GFRAA >60 01/31/2022 10:25 AM    GFRAA >60 10/08/2012 10:30 AM    CALCIUM 9.7 01/31/2022 10:25 AM      Last CMP:   Lab Results   Component Value Date/Time     01/31/2022 10:25 AM    K 5.4 01/31/2022 10:25 AM     01/31/2022 10:25 AM    CO2 24 01/31/2022 10:25 AM    ANIONGAP 15 01/31/2022 10:25 AM    GLUCOSE 84 01/31/2022 10:25 AM    BUN 19 01/31/2022 10:25 AM    CREATININE 0.9 01/31/2022 10:25 AM    LABGLOM >60 01/31/2022 10:25 AM    GFRAA >60 01/31/2022 10:25 AM    GFRAA >60 10/08/2012 10:30 AM    PROT 7.1 01/31/2022 10:25 AM    PROT 7.8 10/08/2012 10:30 AM    LABALBU 4.7 01/31/2022 10:25 AM    AGRATIO 2.0 01/31/2022 10:25 AM    BILITOT 0.5 01/31/2022 10:25 AM    ALKPHOS 84 01/31/2022 10:25 AM    ALT 29 01/31/2022 10:25 AM    AST 34 01/31/2022 10:25 AM    GLOB 2.5 01/29/2021 10:15 AM     Last Renal Function:   Lab Results   Component Value Date/Time     01/31/2022 10:25 AM    K 5.4 01/31/2022 10:25 AM     01/31/2022 10:25 AM    CO2 24 01/31/2022 10:25 AM    GLUCOSE 84 01/31/2022 10:25 AM    BUN 19 01/31/2022 10:25 AM    CREATININE 0.9 01/31/2022 10:25 AM    LABALBU 4.7 01/31/2022 10:25 AM    CALCIUM 9.7 01/31/2022 10:25 AM    GFR >60 10/08/2012 10:30 AM    GFRAA >60 01/31/2022 10:25 AM    GFRAA >60 10/08/2012 10:30 AM       Last OARRS: No flowsheet data found.    Preferred Pharmacy:   [facility] [id number] - [address]  [address] [facility]  Phone: [phone] Fax: [phone] [address], [address] [place] [address] [phone] - f [phone]  [place] [address]  Phone: [phone] Fax: [phone]

## 2023-01-31 NOTE — TELEPHONE ENCOUNTER
Spoke with Grace Chu from [facility], [facility], it needs to be sent to       [facility] [address], [place] [address] [address] (Pharmacy)

## 2023-02-01 ENCOUNTER — OFFICE VISIT (OUTPATIENT)
Dept: PRIMARY CARE CLINIC | Age: 58
End: 2023-02-01
Payer: OTHER GOVERNMENT

## 2023-02-01 VITALS
HEIGHT: 66 IN | BODY MASS INDEX: 22.18 KG/M2 | HEART RATE: 64 BPM | WEIGHT: 138 LBS | OXYGEN SATURATION: 97 % | SYSTOLIC BLOOD PRESSURE: 118 MMHG | DIASTOLIC BLOOD PRESSURE: 84 MMHG

## 2023-02-01 DIAGNOSIS — E78.9 LIPID DISORDER: ICD-10-CM

## 2023-02-01 DIAGNOSIS — E55.9 VITAMIN D DEFICIENCY: ICD-10-CM

## 2023-02-01 DIAGNOSIS — L40.9 PSORIASIS: ICD-10-CM

## 2023-02-01 DIAGNOSIS — Z13.1 DIABETES MELLITUS SCREENING: ICD-10-CM

## 2023-02-01 DIAGNOSIS — Z00.00 ROUTINE PHYSICAL EXAMINATION: ICD-10-CM

## 2023-02-01 DIAGNOSIS — E78.00 HYPERCHOLESTEREMIA: ICD-10-CM

## 2023-02-01 DIAGNOSIS — Z00.00 ROUTINE PHYSICAL EXAMINATION: Primary | ICD-10-CM

## 2023-02-01 DIAGNOSIS — I10 PRIMARY HYPERTENSION: ICD-10-CM

## 2023-02-01 LAB
A/G RATIO: 1.6 (ref 1.1–2.2)
ALBUMIN SERPL-MCNC: 4.4 G/DL (ref 3.4–5)
ALP BLD-CCNC: 68 U/L (ref 40–129)
ALT SERPL-CCNC: 18 U/L (ref 10–40)
ANION GAP SERPL CALCULATED.3IONS-SCNC: 16 MMOL/L (ref 3–16)
AST SERPL-CCNC: 26 U/L (ref 15–37)
BASOPHILS ABSOLUTE: 0 K/UL (ref 0–0.2)
BASOPHILS RELATIVE PERCENT: 0.6 %
BILIRUB SERPL-MCNC: 0.3 MG/DL (ref 0–1)
BUN BLDV-MCNC: 12 MG/DL (ref 7–20)
CALCIUM SERPL-MCNC: 9.9 MG/DL (ref 8.3–10.6)
CHLORIDE BLD-SCNC: 104 MMOL/L (ref 99–110)
CHOLESTEROL, TOTAL: 136 MG/DL (ref 0–199)
CO2: 22 MMOL/L (ref 21–32)
CREAT SERPL-MCNC: 0.8 MG/DL (ref 0.6–1.1)
EOSINOPHILS ABSOLUTE: 0.1 K/UL (ref 0–0.6)
EOSINOPHILS RELATIVE PERCENT: 2.3 %
GFR SERPL CREATININE-BSD FRML MDRD: >60 ML/MIN/{1.73_M2}
GLUCOSE BLD-MCNC: 79 MG/DL (ref 70–99)
HCT VFR BLD CALC: 42.7 % (ref 36–48)
HDLC SERPL-MCNC: 80 MG/DL (ref 40–60)
HEMOGLOBIN: 14 G/DL (ref 12–16)
LDL CHOLESTEROL CALCULATED: 45 MG/DL
LYMPHOCYTES ABSOLUTE: 1.6 K/UL (ref 1–5.1)
LYMPHOCYTES RELATIVE PERCENT: 28 %
MCH RBC QN AUTO: 29.2 PG (ref 26–34)
MCHC RBC AUTO-ENTMCNC: 32.7 G/DL (ref 31–36)
MCV RBC AUTO: 89.4 FL (ref 80–100)
MONOCYTES ABSOLUTE: 0.4 K/UL (ref 0–1.3)
MONOCYTES RELATIVE PERCENT: 6.9 %
NEUTROPHILS ABSOLUTE: 3.5 K/UL (ref 1.7–7.7)
NEUTROPHILS RELATIVE PERCENT: 62.2 %
PDW BLD-RTO: 13.6 % (ref 12.4–15.4)
PLATELET # BLD: 180 K/UL (ref 135–450)
PMV BLD AUTO: 9.6 FL (ref 5–10.5)
POTASSIUM SERPL-SCNC: 4.2 MMOL/L (ref 3.5–5.1)
RBC # BLD: 4.78 M/UL (ref 4–5.2)
SODIUM BLD-SCNC: 142 MMOL/L (ref 136–145)
TOTAL PROTEIN: 7.1 G/DL (ref 6.4–8.2)
TRIGL SERPL-MCNC: 54 MG/DL (ref 0–150)
TSH SERPL DL<=0.05 MIU/L-ACNC: 1.96 UIU/ML (ref 0.27–4.2)
VITAMIN D 25-HYDROXY: 87.7 NG/ML
VLDLC SERPL CALC-MCNC: 11 MG/DL
WBC # BLD: 5.7 K/UL (ref 4–11)

## 2023-02-01 PROCEDURE — 3079F DIAST BP 80-89 MM HG: CPT | Performed by: FAMILY MEDICINE

## 2023-02-01 PROCEDURE — 3074F SYST BP LT 130 MM HG: CPT | Performed by: FAMILY MEDICINE

## 2023-02-01 PROCEDURE — 99396 PREV VISIT EST AGE 40-64: CPT | Performed by: FAMILY MEDICINE

## 2023-02-01 RX ORDER — ROSUVASTATIN CALCIUM 10 MG/1
TABLET, COATED ORAL
Qty: 90 TABLET | Refills: 1 | Status: SHIPPED | OUTPATIENT
Start: 2023-02-01

## 2023-02-01 SDOH — ECONOMIC STABILITY: FOOD INSECURITY: WITHIN THE PAST 12 MONTHS, YOU WORRIED THAT YOUR FOOD WOULD RUN OUT BEFORE YOU GOT MONEY TO BUY MORE.: NEVER TRUE

## 2023-02-01 SDOH — ECONOMIC STABILITY: FOOD INSECURITY: WITHIN THE PAST 12 MONTHS, THE FOOD YOU BOUGHT JUST DIDN'T LAST AND YOU DIDN'T HAVE MONEY TO GET MORE.: NEVER TRUE

## 2023-02-01 SDOH — ECONOMIC STABILITY: INCOME INSECURITY: HOW HARD IS IT FOR YOU TO PAY FOR THE VERY BASICS LIKE FOOD, HOUSING, MEDICAL CARE, AND HEATING?: NOT HARD AT ALL

## 2023-02-01 ASSESSMENT — ENCOUNTER SYMPTOMS
APNEA: 0
EYES NEGATIVE: 1
RESPIRATORY NEGATIVE: 1
SHORTNESS OF BREATH: 0
WHEEZING: 0

## 2023-02-01 ASSESSMENT — PATIENT HEALTH QUESTIONNAIRE - PHQ9
SUM OF ALL RESPONSES TO PHQ QUESTIONS 1-9: 0
4. FEELING TIRED OR HAVING LITTLE ENERGY: 0
SUM OF ALL RESPONSES TO PHQ QUESTIONS 1-9: 0
SUM OF ALL RESPONSES TO PHQ9 QUESTIONS 1 & 2: 0
3. TROUBLE FALLING OR STAYING ASLEEP: 0
SUM OF ALL RESPONSES TO PHQ QUESTIONS 1-9: 0
9. THOUGHTS THAT YOU WOULD BE BETTER OFF DEAD, OR OF HURTING YOURSELF: 0
8. MOVING OR SPEAKING SO SLOWLY THAT OTHER PEOPLE COULD HAVE NOTICED. OR THE OPPOSITE, BEING SO FIGETY OR RESTLESS THAT YOU HAVE BEEN MOVING AROUND A LOT MORE THAN USUAL: 0
5. POOR APPETITE OR OVEREATING: 0
2. FEELING DOWN, DEPRESSED OR HOPELESS: 0
SUM OF ALL RESPONSES TO PHQ QUESTIONS 1-9: 0
6. FEELING BAD ABOUT YOURSELF - OR THAT YOU ARE A FAILURE OR HAVE LET YOURSELF OR YOUR FAMILY DOWN: 0
10. IF YOU CHECKED OFF ANY PROBLEMS, HOW DIFFICULT HAVE THESE PROBLEMS MADE IT FOR YOU TO DO YOUR WORK, TAKE CARE OF THINGS AT HOME, OR GET ALONG WITH OTHER PEOPLE: 0
1. LITTLE INTEREST OR PLEASURE IN DOING THINGS: 0
7. TROUBLE CONCENTRATING ON THINGS, SUCH AS READING THE NEWSPAPER OR WATCHING TELEVISION: 0

## 2023-02-01 NOTE — PROGRESS NOTES
SUBJECTIVE:  Patient ID: Bryanna Yin is a 56 y.o. female. Chief Complaint:  Chief Complaint   Patient presents with    Annual Exam       HPI  56year old Female  Annual    Past Medical History:   Diagnosis Date    Hypercholesteremia     Hypertension     Kidney lesion, native, right 02/17/2021    Psoriasis      Past Surgical History:   Procedure Laterality Date    KNEE SURGERY Right 02/14/2019    Jonah Rivera [place]    SARKIS AND BSO (CERVIX REMOVED)  58    FHx Mother +ovarian cancer    THYROIDECTOMY, PARTIAL  2005    Nodule ,Benign Lt     Allergies   Allergen Reactions    Minocycline Swelling     Throat closes up     Family History   Problem Relation Age of Onset    Cancer Mother         ovarian cancer @age 54    Cancer Father         Now 79 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    Coronary Art Dis Father         + recent Stent    Heart Disease Father     High Blood Pressure Father     High Cholesterol Father     Psoriasis Father     Heart Disease Brother         Stent @age 62year old    High Cholesterol Brother      Social History     Social History Narrative         FT job    2 children 35 & 34    4 G children 2 biological    Stay Home     Horses       Patient Active Problem List   Diagnosis    Hypertension    Eczema    Family history of ovarian cancer    Hypercholesteremia    Vitamin D deficiency    Pityriasis lichenoides    Liver cyst    Kidney lesion, native, right    Angiomyolipoma of right kidney     Current Outpatient Medications   Medication Sig Dispense Refill    lisinopril (PRINIVIL;ZESTRIL) 5 MG tablet : TAKE 1 TABLET DAILY 90 tablet 0    rosuvastatin (CRESTOR) 10 MG tablet TAKE 1 TABLET DAILY 90 tablet 1    Cholecalciferol (VITAMIN D) 2000 units CAPS capsule Take by mouth      aspirin 81 MG tablet Take 81 mg by mouth daily       No current facility-administered medications for this visit.      Lab Results   Component Value Date    WBC 5.6 01/31/2022    HGB 14.3 01/31/2022    HCT 42.8 01/31/2022    MCV 91.1 01/31/2022     01/31/2022     Lab Results   Component Value Date    CHOL 134 01/31/2022    CHOL 124 01/29/2021    CHOL 129 02/24/2020     Lab Results   Component Value Date    TRIG 76 01/31/2022    TRIG 69 01/29/2021    TRIG 75 02/24/2020     Lab Results   Component Value Date    HDL 69 (H) 01/31/2022    HDL 73 (H) 01/29/2021    HDL 78 (H) 02/24/2020     Lab Results   Component Value Date    LDLCALC 50 01/31/2022    LDLCALC 37 01/29/2021    LDLCALC 36 02/24/2020     Lab Results   Component Value Date    LABVLDL 15 01/31/2022    LABVLDL 14 01/29/2021    LABVLDL 15 02/24/2020     No results found for: [facility]    Chemistry        Component Value Date/Time     01/31/2022 1025    K 5.4 (H) 01/31/2022 1025     01/31/2022 1025    CO2 24 01/31/2022 1025    BUN 19 01/31/2022 1025    CREATININE 0.9 01/31/2022 1025        Component Value Date/Time    CALCIUM 9.7 01/31/2022 1025    ALKPHOS 84 01/31/2022 1025    AST 34 01/31/2022 1025    ALT 29 01/31/2022 1025    BILITOT 0.5 01/31/2022 1025            Review of Systems   Constitutional:         Fine  Anti inflammatory diet due Psoriasis  No process food   HENT: Negative. Eyes: Negative. Respiratory: Negative. Negative for apnea, shortness of breath and wheezing. Cardiovascular: Negative. Negative for chest pain, palpitations and leg swelling. Gastrointestinal:         Colonoscopy done age 50 due @61   Hx Liver cyst   Endocrine: Negative. Genitourinary:  Negative for dysuria and flank pain. TAHBSO at 46 year   Mother + Ovarian cancer   Musculoskeletal:         Knee Pain due Horse injury  DC Aleve   Skin:         Psoriasis  Dermatology care   [facility]   Allergic/Immunologic: Positive for environmental allergies. Neurological:  Negative for dizziness, light-headedness and headaches. Hematological: Negative. Psychiatric/Behavioral: Negative.   Negative for behavioral problems, confusion, self-injury, sleep disturbance and suicidal ideas. The patient is not hyperactive. OBJECTIVE:  /84   Pulse 64   Ht 5' 6\" (1.676 m)   Wt 138 lb (62.6 kg)   LMP 05/09/2007   SpO2 97%   BMI 22.27 kg/m²   Physical Exam  Constitutional:       Appearance: Normal appearance. HENT:      Head: Normocephalic. Right Ear: Tympanic membrane normal.      Left Ear: Tympanic membrane normal.   Eyes:      Extraocular Movements: Extraocular movements intact. Pupils: Pupils are equal, round, and reactive to light. Neck:      Comments: Surgical scar  Cardiovascular:      Rate and Rhythm: Normal rate and regular rhythm. Pulses: Normal pulses. Heart sounds: Normal heart sounds. Pulmonary:      Effort: Pulmonary effort is normal.      Breath sounds: Normal breath sounds. Abdominal:      Palpations: Abdomen is soft. Musculoskeletal:         General: No tenderness, deformity or signs of injury. Normal range of motion. Cervical back: Normal range of motion and neck supple. Neurological:      General: No focal deficit present. Mental Status: She is alert and oriented to person, place, and time. Psychiatric:         Mood and Affect: Mood normal.         Behavior: Behavior normal.         Thought Content: Thought content normal.         Judgment: Judgment normal.       ASSESSMENT/PLAN:      Diagnosis Orders   1. Routine physical examination  CBC with Auto Differential    Comprehensive Metabolic Panel    Hemoglobin A1C    Lipid Panel    TSH    Vitamin D 25 Hydroxy      2. Donald Morris MD, Dermatology, [facility]      3. Diabetes mellitus screening  Hemoglobin A1C      4. Vitamin D deficiency  Vitamin D 25 Hydroxy      5. Primary hypertension        6. Lipid disorder  Lipid Panel      7.  Hypercholesteremia  rosuvastatin (CRESTOR) 10 MG tablet          Order as above  Covid vaccine done  Flu shot done

## 2023-02-02 LAB
ESTIMATED AVERAGE GLUCOSE: 108.3 MG/DL
HBA1C MFR BLD: 5.4 %

## 2023-05-01 DIAGNOSIS — I10 ESSENTIAL HYPERTENSION: ICD-10-CM

## 2023-05-01 RX ORDER — LISINOPRIL 5 MG/1
TABLET ORAL
Qty: 90 TABLET | Refills: 1 | Status: SHIPPED | OUTPATIENT
Start: 2023-05-01

## 2023-07-31 DIAGNOSIS — E78.00 HYPERCHOLESTEREMIA: ICD-10-CM

## 2023-07-31 RX ORDER — ROSUVASTATIN CALCIUM 10 MG/1
TABLET, COATED ORAL
Qty: 90 TABLET | Refills: 1 | Status: SHIPPED | OUTPATIENT
Start: 2023-07-31

## 2023-07-31 NOTE — TELEPHONE ENCOUNTER
Medication:   Requested Prescriptions     Pending Prescriptions Disp Refills    rosuvastatin (CRESTOR) 10 MG tablet [Pharmacy Med Name: ROSUVASTATIN TABS 10MG] 90 tablet 3     Sig: TAKE 1 TABLET DAILY     Last Filled:  2.1.23    Last appt: 2/1/2023   Next appt: Visit date not found    Last Lipid:   Lab Results   Component Value Date/Time    CHOL 136 02/01/2023 10:33 AM    TRIG 54 02/01/2023 10:33 AM    HDL 80 02/01/2023 10:33 AM    LDLCALC 45 02/01/2023 10:33 AM

## 2023-10-27 DIAGNOSIS — I10 ESSENTIAL HYPERTENSION: ICD-10-CM

## 2023-10-27 RX ORDER — LISINOPRIL 5 MG/1
TABLET ORAL
Qty: 90 TABLET | Refills: 1 | Status: SHIPPED | OUTPATIENT
Start: 2023-10-27

## 2023-10-27 NOTE — TELEPHONE ENCOUNTER
Medication:   Requested Prescriptions     Pending Prescriptions Disp Refills    lisinopril (PRINIVIL;ZESTRIL) 5 MG tablet [Pharmacy Med Name: LISINOPRIL TABS 5MG] 90 tablet 3     Sig: TAKE 1 TABLET DAILY     Last Filled:  5.1.23    Last appt: 2/1/2023   Next appt: Visit date not found  Pt state Dr. Srinath Fam told her she does not need a 6 month appointment .   Last OARRS:        No data to display

## 2024-01-29 DIAGNOSIS — E78.00 HYPERCHOLESTEREMIA: ICD-10-CM

## 2024-01-29 RX ORDER — ROSUVASTATIN CALCIUM 10 MG/1
TABLET, COATED ORAL
Qty: 90 TABLET | Refills: 1 | Status: SHIPPED | OUTPATIENT
Start: 2024-01-29

## 2024-01-29 NOTE — TELEPHONE ENCOUNTER
Medication:   Requested Prescriptions     Pending Prescriptions Disp Refills    rosuvastatin (CRESTOR) 10 MG tablet [Pharmacy Med Name: ROSUVASTATIN TABS 10MG] 90 tablet 3     Sig: TAKE 1 TABLET DAILY     Last Filled:  7.31.23    Last appt: 2/1/2023   Next appt: 1/31/2024    Last Lipid:   Lab Results   Component Value Date/Time    CHOL 136 02/01/2023 10:33 AM    TRIG 54 02/01/2023 10:33 AM    HDL 80 02/01/2023 10:33 AM    LDLCALC 45 02/01/2023 10:33 AM

## 2024-01-31 ENCOUNTER — OFFICE VISIT (OUTPATIENT)
Dept: PRIMARY CARE CLINIC | Age: 59
End: 2024-01-31
Payer: OTHER GOVERNMENT

## 2024-01-31 VITALS
OXYGEN SATURATION: 98 % | SYSTOLIC BLOOD PRESSURE: 118 MMHG | DIASTOLIC BLOOD PRESSURE: 76 MMHG | TEMPERATURE: 97.8 F | HEIGHT: 66 IN | BODY MASS INDEX: 23.05 KG/M2 | HEART RATE: 83 BPM | WEIGHT: 143.4 LBS

## 2024-01-31 DIAGNOSIS — Z00.00 ROUTINE PHYSICAL EXAMINATION: ICD-10-CM

## 2024-01-31 DIAGNOSIS — Z13.1 DIABETES MELLITUS SCREENING: ICD-10-CM

## 2024-01-31 DIAGNOSIS — Z13.220 LIPID SCREENING: ICD-10-CM

## 2024-01-31 DIAGNOSIS — E55.9 VITAMIN D DEFICIENCY: ICD-10-CM

## 2024-01-31 DIAGNOSIS — Z00.00 ROUTINE PHYSICAL EXAMINATION: Primary | ICD-10-CM

## 2024-01-31 DIAGNOSIS — I10 PRIMARY HYPERTENSION: ICD-10-CM

## 2024-01-31 LAB
25(OH)D3 SERPL-MCNC: 79.4 NG/ML
ALBUMIN SERPL-MCNC: 4.5 G/DL (ref 3.4–5)
ALBUMIN/GLOB SERPL: 1.7 {RATIO} (ref 1.1–2.2)
ALP SERPL-CCNC: 89 U/L (ref 40–129)
ALT SERPL-CCNC: 35 U/L (ref 10–40)
ANION GAP SERPL CALCULATED.3IONS-SCNC: 9 MMOL/L (ref 3–16)
AST SERPL-CCNC: 36 U/L (ref 15–37)
BASOPHILS # BLD: 0 K/UL (ref 0–0.2)
BASOPHILS NFR BLD: 0.5 %
BILIRUB SERPL-MCNC: 0.5 MG/DL (ref 0–1)
BUN SERPL-MCNC: 11 MG/DL (ref 7–20)
CALCIUM SERPL-MCNC: 9 MG/DL (ref 8.3–10.6)
CHLORIDE SERPL-SCNC: 101 MMOL/L (ref 99–110)
CHOLEST SERPL-MCNC: 143 MG/DL (ref 0–199)
CO2 SERPL-SCNC: 27 MMOL/L (ref 21–32)
CREAT SERPL-MCNC: 0.8 MG/DL (ref 0.6–1.1)
DEPRECATED RDW RBC AUTO: 13.6 % (ref 12.4–15.4)
EOSINOPHIL # BLD: 0.1 K/UL (ref 0–0.6)
EOSINOPHIL NFR BLD: 2.2 %
GFR SERPLBLD CREATININE-BSD FMLA CKD-EPI: >60 ML/MIN/{1.73_M2}
GLUCOSE SERPL-MCNC: 87 MG/DL (ref 70–99)
HCT VFR BLD AUTO: 41.4 % (ref 36–48)
HDLC SERPL-MCNC: 78 MG/DL (ref 40–60)
HGB BLD-MCNC: 14 G/DL (ref 12–16)
LDLC SERPL CALC-MCNC: 51 MG/DL
LYMPHOCYTES # BLD: 1.9 K/UL (ref 1–5.1)
LYMPHOCYTES NFR BLD: 32.1 %
MCH RBC QN AUTO: 30.4 PG (ref 26–34)
MCHC RBC AUTO-ENTMCNC: 33.8 G/DL (ref 31–36)
MCV RBC AUTO: 89.7 FL (ref 80–100)
MONOCYTES # BLD: 0.4 K/UL (ref 0–1.3)
MONOCYTES NFR BLD: 7.3 %
NEUTROPHILS # BLD: 3.4 K/UL (ref 1.7–7.7)
NEUTROPHILS NFR BLD: 57.9 %
PLATELET # BLD AUTO: 199 K/UL (ref 135–450)
PMV BLD AUTO: 9.4 FL (ref 5–10.5)
POTASSIUM SERPL-SCNC: 4.4 MMOL/L (ref 3.5–5.1)
PROT SERPL-MCNC: 7.1 G/DL (ref 6.4–8.2)
RBC # BLD AUTO: 4.61 M/UL (ref 4–5.2)
SODIUM SERPL-SCNC: 137 MMOL/L (ref 136–145)
TRIGL SERPL-MCNC: 71 MG/DL (ref 0–150)
TSH SERPL DL<=0.005 MIU/L-ACNC: 2.53 UIU/ML (ref 0.27–4.2)
VLDLC SERPL CALC-MCNC: 14 MG/DL
WBC # BLD AUTO: 5.9 K/UL (ref 4–11)

## 2024-01-31 PROCEDURE — 3074F SYST BP LT 130 MM HG: CPT | Performed by: FAMILY MEDICINE

## 2024-01-31 PROCEDURE — 3078F DIAST BP <80 MM HG: CPT | Performed by: FAMILY MEDICINE

## 2024-01-31 PROCEDURE — 99396 PREV VISIT EST AGE 40-64: CPT | Performed by: FAMILY MEDICINE

## 2024-01-31 RX ORDER — CETIRIZINE HYDROCHLORIDE 10 MG/1
10 TABLET ORAL DAILY
COMMUNITY

## 2024-01-31 ASSESSMENT — ENCOUNTER SYMPTOMS
CHEST TIGHTNESS: 0
EYES NEGATIVE: 1
RESPIRATORY NEGATIVE: 1
BACK PAIN: 0
STRIDOR: 0
WHEEZING: 0
APNEA: 0

## 2024-01-31 ASSESSMENT — PATIENT HEALTH QUESTIONNAIRE - PHQ9
SUM OF ALL RESPONSES TO PHQ QUESTIONS 1-9: 0
SUM OF ALL RESPONSES TO PHQ9 QUESTIONS 1 & 2: 0
SUM OF ALL RESPONSES TO PHQ QUESTIONS 1-9: 0
2. FEELING DOWN, DEPRESSED OR HOPELESS: 0
1. LITTLE INTEREST OR PLEASURE IN DOING THINGS: 0

## 2024-01-31 NOTE — PROGRESS NOTES
Diagnosis Orders   1. Routine physical examination  CBC with Auto Differential    Comprehensive Metabolic Panel    Hemoglobin A1C    Lipid Panel    TSH    Vitamin D 25 Hydroxy      2. Lipid screening  Lipid Panel      3. Diabetes mellitus screening  Hemoglobin A1C      4. Vitamin D deficiency  Vitamin D 25 Hydroxy      5. Primary hypertension          Hypertension BP reading adequate continue current Rx & lab update  Lipid disorder continue current Rx & lab update  Immunization up to date  Mammogram is Due March 2024  Colonoscopy done 2017

## 2024-02-01 LAB
EST. AVERAGE GLUCOSE BLD GHB EST-MCNC: 105.4 MG/DL
HBA1C MFR BLD: 5.3 %

## 2024-04-08 SDOH — HEALTH STABILITY: PHYSICAL HEALTH: ON AVERAGE, HOW MANY DAYS PER WEEK DO YOU ENGAGE IN MODERATE TO STRENUOUS EXERCISE (LIKE A BRISK WALK)?: 5 DAYS

## 2024-04-08 SDOH — HEALTH STABILITY: PHYSICAL HEALTH: ON AVERAGE, HOW MANY MINUTES DO YOU ENGAGE IN EXERCISE AT THIS LEVEL?: 40 MIN

## 2024-04-11 ENCOUNTER — OFFICE VISIT (OUTPATIENT)
Dept: PRIMARY CARE CLINIC | Age: 59
End: 2024-04-11
Payer: OTHER GOVERNMENT

## 2024-04-11 VITALS
SYSTOLIC BLOOD PRESSURE: 116 MMHG | DIASTOLIC BLOOD PRESSURE: 72 MMHG | HEART RATE: 75 BPM | WEIGHT: 145 LBS | OXYGEN SATURATION: 98 % | BODY MASS INDEX: 23.3 KG/M2 | HEIGHT: 66 IN | TEMPERATURE: 98.1 F

## 2024-04-11 DIAGNOSIS — Z76.89 ENCOUNTER TO ESTABLISH CARE: Primary | ICD-10-CM

## 2024-04-11 DIAGNOSIS — D17.71 ANGIOMYOLIPOMA OF RIGHT KIDNEY: ICD-10-CM

## 2024-04-11 DIAGNOSIS — L40.9 PSORIASIS: ICD-10-CM

## 2024-04-11 DIAGNOSIS — K76.89 LIVER CYST: ICD-10-CM

## 2024-04-11 DIAGNOSIS — E78.00 HYPERCHOLESTEREMIA: ICD-10-CM

## 2024-04-11 DIAGNOSIS — I10 ESSENTIAL HYPERTENSION: ICD-10-CM

## 2024-04-11 PROCEDURE — 99203 OFFICE O/P NEW LOW 30 MIN: CPT | Performed by: FAMILY MEDICINE

## 2024-04-11 PROCEDURE — 3078F DIAST BP <80 MM HG: CPT | Performed by: FAMILY MEDICINE

## 2024-04-11 PROCEDURE — 3074F SYST BP LT 130 MM HG: CPT | Performed by: FAMILY MEDICINE

## 2024-04-11 RX ORDER — LISINOPRIL 5 MG/1
5 TABLET ORAL DAILY
Qty: 90 TABLET | Refills: 3 | Status: SHIPPED | OUTPATIENT
Start: 2024-04-11

## 2024-04-11 RX ORDER — ROSUVASTATIN CALCIUM 10 MG/1
10 TABLET, COATED ORAL DAILY
Qty: 90 TABLET | Refills: 3 | Status: SHIPPED | OUTPATIENT
Start: 2024-04-11

## 2024-04-11 SDOH — ECONOMIC STABILITY: FOOD INSECURITY: WITHIN THE PAST 12 MONTHS, THE FOOD YOU BOUGHT JUST DIDN'T LAST AND YOU DIDN'T HAVE MONEY TO GET MORE.: NEVER TRUE

## 2024-04-11 SDOH — ECONOMIC STABILITY: FOOD INSECURITY: WITHIN THE PAST 12 MONTHS, YOU WORRIED THAT YOUR FOOD WOULD RUN OUT BEFORE YOU GOT MONEY TO BUY MORE.: NEVER TRUE

## 2024-04-11 SDOH — ECONOMIC STABILITY: INCOME INSECURITY: HOW HARD IS IT FOR YOU TO PAY FOR THE VERY BASICS LIKE FOOD, HOUSING, MEDICAL CARE, AND HEATING?: NOT HARD AT ALL

## 2024-04-11 NOTE — PATIENT INSTRUCTIONS
Check out [facility] [phone] (use to be derm of [facility])    Return here in 1 year for your physical

## 2024-04-11 NOTE — PROGRESS NOTES
Normocephalic.      Right Ear: Tympanic membrane, ear canal and external ear normal.      Left Ear: Tympanic membrane, ear canal and external ear normal.      Nose: Nose normal.      Mouth/Throat:      Mouth: Mucous membranes are moist.      Pharynx: Oropharynx is clear. No oropharyngeal exudate or posterior oropharyngeal erythema.   Eyes:      Conjunctiva/sclera: Conjunctivae normal.   Cardiovascular:      Rate and Rhythm: Normal rate and regular rhythm.      Heart sounds: Normal heart sounds.   Pulmonary:      Effort: Pulmonary effort is normal.      Breath sounds: Normal breath sounds.   Abdominal:      Palpations: Abdomen is soft.      Tenderness: There is no abdominal tenderness.   Musculoskeletal:      Cervical back: Normal range of motion and neck supple.   Neurological:      Mental Status: She is alert.          ASSESSMENT and PLAN:    1. Encounter to establish care--notes from previous PCP reviewed.    2. Essential hypertension--controlled on current medication.  Okay refills for 1 year.  Recent labs reviewed with patient.  - lisinopril (PRINIVIL;ZESTRIL) 5 MG tablet; Take 1 tablet by mouth daily  Dispense: 90 tablet; Refill: 3    3. Hypercholesteremia--controlled on current medication.  Okay refills for 1 year.  Recent labs reviewed and in target range  - rosuvastatin (CRESTOR) 10 MG tablet; Take 1 tablet by mouth daily  Dispense: 90 tablet; Refill: 3    4. Liver cyst--incidental finding noted on imaging in 2021.  Reviewed results with patient.  Patient made aware if she starts developing discomfort or pain will then need repeat imaging to re-assess this    5. Angiomyolipoma of right kidney-- same as #4.      6. Psoriasis-- no flares currently.  Refer to derm below.  Cont otc meds prn      Patient Instructions   Check out [facility] [phone] (use to be derm of [facility])    Return here in 1 year for your physical      Please note that some or all of this record was generated using

## 2024-09-09 ENCOUNTER — PATIENT MESSAGE (OUTPATIENT)
Dept: PRIMARY CARE CLINIC | Age: 59
End: 2024-09-09

## 2024-09-11 ENCOUNTER — OFFICE VISIT (OUTPATIENT)
Dept: PRIMARY CARE CLINIC | Age: 59
End: 2024-09-11

## 2024-09-11 VITALS
SYSTOLIC BLOOD PRESSURE: 115 MMHG | BODY MASS INDEX: 22.66 KG/M2 | HEIGHT: 66 IN | DIASTOLIC BLOOD PRESSURE: 72 MMHG | OXYGEN SATURATION: 98 % | WEIGHT: 141 LBS | HEART RATE: 78 BPM | TEMPERATURE: 97 F

## 2024-09-11 DIAGNOSIS — G43.719 INTRACTABLE CHRONIC MIGRAINE WITHOUT AURA AND WITHOUT STATUS MIGRAINOSUS: Primary | ICD-10-CM

## 2024-09-11 RX ORDER — TOPIRAMATE 25 MG/1
TABLET, FILM COATED ORAL
Qty: 70 TABLET | Refills: 1 | Status: SHIPPED | OUTPATIENT
Start: 2024-09-11 | End: 2024-10-25

## 2024-09-11 RX ORDER — SUMATRIPTAN 50 MG/1
50 TABLET, FILM COATED ORAL
Qty: 9 TABLET | Refills: 0 | Status: SHIPPED | OUTPATIENT
Start: 2024-09-11 | End: 2024-09-11

## 2024-10-14 DIAGNOSIS — G43.719 INTRACTABLE CHRONIC MIGRAINE WITHOUT AURA AND WITHOUT STATUS MIGRAINOSUS: ICD-10-CM

## 2024-10-14 DIAGNOSIS — I10 ESSENTIAL HYPERTENSION: ICD-10-CM

## 2024-10-14 DIAGNOSIS — E78.00 HYPERCHOLESTEREMIA: ICD-10-CM

## 2024-10-14 RX ORDER — SUMATRIPTAN 50 MG/1
50 TABLET, FILM COATED ORAL
Qty: 9 TABLET | Refills: 0 | Status: SHIPPED | OUTPATIENT
Start: 2024-10-14 | End: 2024-10-14

## 2024-10-14 RX ORDER — LISINOPRIL 5 MG/1
5 TABLET ORAL DAILY
Qty: 90 TABLET | Refills: 3 | Status: SHIPPED | OUTPATIENT
Start: 2024-10-14

## 2024-10-14 RX ORDER — ROSUVASTATIN CALCIUM 10 MG/1
10 TABLET, COATED ORAL DAILY
Qty: 90 TABLET | Refills: 3 | Status: SHIPPED | OUTPATIENT
Start: 2024-10-14

## 2024-10-14 RX ORDER — TOPIRAMATE 25 MG/1
TABLET, FILM COATED ORAL
Qty: 70 TABLET | Refills: 1 | Status: SHIPPED | OUTPATIENT
Start: 2024-10-14 | End: 2024-11-26

## 2024-10-14 NOTE — TELEPHONE ENCOUNTER
Refill Request       Last Seen: Last Seen Department: 9/11/2024  Last Seen by PCP: 4/11/2024    Last Written:     lisinopril (PRINIVIL;ZESTRIL) 5 MG tablet   4/11/24 90 3 refills     rosuvastatin (CRESTOR) 10 MG tablet   -4/11/24 9 3 refills   SUMAtriptan (IMITREX) 50 MG tablet -9/11/24 9 0 refills   topiramate (TOPAMAX) 25 MG tablet -9/11/24 70 1 refill   Next Appointment:   Future Appointments   Date Time Provider Department Center   12/12/2024 11:00 AM Jacey Navarro MD [facility]             Requested Prescriptions     Pending Prescriptions Disp Refills    topiramate (TOPAMAX) 25 MG tablet 70 tablet 1     Sig: Take 1 tablet by mouth daily for 7 days, THEN 2 tablets daily for 7 days, THEN 2 tablets 2 times daily.    SUMAtriptan (IMITREX) 50 MG tablet 9 tablet 0     Sig: Take 1 tablet by mouth once as needed for Migraine    rosuvastatin (CRESTOR) 10 MG tablet 90 tablet 3     Sig: Take 1 tablet by mouth daily    lisinopril (PRINIVIL;ZESTRIL) 5 MG tablet 90 tablet 3     Sig: Take 1 tablet by mouth daily

## 2024-10-25 ENCOUNTER — PATIENT MESSAGE (OUTPATIENT)
Dept: PRIMARY CARE CLINIC | Age: 59
End: 2024-10-25

## 2024-10-28 ENCOUNTER — OFFICE VISIT (OUTPATIENT)
Dept: PRIMARY CARE CLINIC | Age: 59
End: 2024-10-28
Payer: COMMERCIAL

## 2024-10-28 VITALS
DIASTOLIC BLOOD PRESSURE: 70 MMHG | BODY MASS INDEX: 22.73 KG/M2 | WEIGHT: 140.8 LBS | HEART RATE: 77 BPM | SYSTOLIC BLOOD PRESSURE: 110 MMHG | OXYGEN SATURATION: 99 %

## 2024-10-28 DIAGNOSIS — T88.7XXA MEDICATION SIDE EFFECTS: ICD-10-CM

## 2024-10-28 DIAGNOSIS — G43.719 INTRACTABLE CHRONIC MIGRAINE WITHOUT AURA AND WITHOUT STATUS MIGRAINOSUS: Primary | ICD-10-CM

## 2024-10-28 PROCEDURE — 3078F DIAST BP <80 MM HG: CPT | Performed by: FAMILY MEDICINE

## 2024-10-28 PROCEDURE — 99213 OFFICE O/P EST LOW 20 MIN: CPT | Performed by: FAMILY MEDICINE

## 2024-10-28 PROCEDURE — 3074F SYST BP LT 130 MM HG: CPT | Performed by: FAMILY MEDICINE

## 2024-10-28 NOTE — PROGRESS NOTES
Jacey Navarro MD  [facility]   Family and Community Medicine Residency Practice   [address], [address]  [facility] [address]  Phone: [phone]  Fax: [phone]        SUBJECTIVE:  Ms. Natarajan is a 59 y.o. female here for the chief complaint below:      1-migraine/tension headache--patient was evaluated about 6 weeks ago here for this.  She was started on Topamax and Imitrex.  Patient never took the Imitrex, and unfortunately is having side effects to the Topamax.  Patient did send us a MyChart last week about the side effects, and currently is weaning off the Topamax.  States very anxious and itchy ever since starting the Topamax.  It is getting worse as she progresses higher on the doses.  Her headaches have improved while on the Topamax.  Has not been using her Excedrin or ice packs.  But is very uncomfortable due to side effects.  Patient has never seen a headache specialist neurologist for these.  States headaches have increased as she has gotten older and menopausal.  Typical flares with changes of the season, weather changes.  Prior to starting the Topamax, patient was having headaches most days of the week and taking Excedrin pretty much daily.  Denies any current/ recent visions changes, sinus pressure, ear pain/ fullness, dietary changes.  Drinks at least 70 -100 oz water daily      Past Medical History:   Diagnosis Date    Hypercholesteremia     Hypertension     Kidney lesion, native, right 02/17/2021    Psoriasis         Past Surgical History:   Procedure Laterality Date    KNEE SURGERY Right 02/14/2019    Nimesh Jo /Ck    SARKIS AND BSO (CERVIX REMOVED)  1996    FHx Mother +ovarian cancer    THYROIDECTOMY, PARTIAL  2005    Nodule ,Benign Lt        Current Outpatient Medications   Medication Sig Dispense Refill    topiramate (TOPAMAX) 25 MG tablet Take 1 tablet by mouth daily for 7 days, THEN 2 tablets daily for 7 days, THEN 2 tablets 2 times daily. 70 tablet 1

## 2024-10-28 NOTE — PATIENT INSTRUCTIONS
Starting tomorrow- take the topamax dailythru the wkdn and then stop    Please return here if headaches are returning

## 2024-12-13 ENCOUNTER — HOSPITAL ENCOUNTER (EMERGENCY)
Age: 59
Discharge: HOME | End: 2024-12-13
Payer: OTHER MISCELLANEOUS

## 2024-12-13 ENCOUNTER — APPOINTMENT (OUTPATIENT)
Dept: GENERAL RADIOLOGY | Age: 59
End: 2024-12-13
Payer: OTHER MISCELLANEOUS

## 2024-12-13 VITALS
SYSTOLIC BLOOD PRESSURE: 143 MMHG | RESPIRATION RATE: 18 BRPM | OXYGEN SATURATION: 98 % | HEART RATE: 73 BPM | DIASTOLIC BLOOD PRESSURE: 92 MMHG | TEMPERATURE: 98 F
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
## 2024-12-13 DIAGNOSIS — R07.89 CHEST WALL PAIN: ICD-10-CM

## 2024-12-13 PROCEDURE — 71045 X-RAY EXAM CHEST 1 VIEW: CPT

## 2024-12-13 PROCEDURE — 71120 X-RAY EXAM BREASTBONE 2/>VWS: CPT

## 2024-12-13 PROCEDURE — 99283 EMERGENCY DEPT VISIT LOW MDM: CPT

## 2024-12-13 RX ORDER — LIDOCAINE 50 MG/G
1 PATCH TOPICAL DAILY
Qty: 10 PATCH | Refills: 0 | Status: SHIPPED | OUTPATIENT
Start: 2024-12-13 | End: 2024-12-23

## 2024-12-13 ASSESSMENT — LIFESTYLE VARIABLES
HOW MANY STANDARD DRINKS CONTAINING ALCOHOL DO YOU HAVE ON A TYPICAL DAY: PATIENT DOES NOT DRINK
HOW OFTEN DO YOU HAVE A DRINK CONTAINING ALCOHOL: NEVER

## 2024-12-13 ASSESSMENT — PAIN SCALES - GENERAL: PAINLEVEL_OUTOF10: 5

## 2024-12-13 ASSESSMENT — PAIN - FUNCTIONAL ASSESSMENT: PAIN_FUNCTIONAL_ASSESSMENT: 0-10
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
## 2024-12-13 NOTE — DISCHARGE INSTRUCTIONS
Continue topical pain patches and ibuprofen or Tylenol as needed and follow-up with your regular doctor outpatient

## 2025-01-13 ENCOUNTER — HOSPITAL ENCOUNTER (OUTPATIENT)
Dept: CT IMAGING | Age: 60
Discharge: HOME | End: 2025-01-13
Attending: FAMILY MEDICINE
Payer: OTHER MISCELLANEOUS

## 2025-01-13 ENCOUNTER — OFFICE VISIT (OUTPATIENT)
Dept: PRIMARY CARE CLINIC | Age: 60
End: 2025-01-13

## 2025-01-13 VITALS
DIASTOLIC BLOOD PRESSURE: 62 MMHG | BODY MASS INDEX: 22.5 KG/M2 | HEIGHT: 66 IN | HEART RATE: 70 BPM | SYSTOLIC BLOOD PRESSURE: 106 MMHG | WEIGHT: 140 LBS | OXYGEN SATURATION: 100 %

## 2025-01-13 DIAGNOSIS — R07.89 PAIN OF STERNUM: ICD-10-CM

## 2025-01-13 DIAGNOSIS — R07.89 PAIN OF STERNUM: Primary | ICD-10-CM

## 2025-01-13 PROCEDURE — 3078F DIAST BP <80 MM HG: CPT | Performed by: FAMILY MEDICINE

## 2025-01-13 PROCEDURE — 71250 CT THORAX DX C-: CPT

## 2025-01-13 PROCEDURE — 3074F SYST BP LT 130 MM HG: CPT | Performed by: FAMILY MEDICINE

## 2025-01-13 PROCEDURE — 99214 OFFICE O/P EST MOD 30 MIN: CPT | Performed by: FAMILY MEDICINE

## 2025-01-13 SDOH — ECONOMIC STABILITY: FOOD INSECURITY: WITHIN THE PAST 12 MONTHS, THE FOOD YOU BOUGHT JUST DIDN'T LAST AND YOU DIDN'T HAVE MONEY TO GET MORE.: NEVER TRUE

## 2025-01-13 SDOH — ECONOMIC STABILITY: FOOD INSECURITY: WITHIN THE PAST 12 MONTHS, YOU WORRIED THAT YOUR FOOD WOULD RUN OUT BEFORE YOU GOT MONEY TO BUY MORE.: NEVER TRUE

## 2025-01-13 ASSESSMENT — PATIENT HEALTH QUESTIONNAIRE - PHQ9
SUM OF ALL RESPONSES TO PHQ QUESTIONS 1-9: 0
SUM OF ALL RESPONSES TO PHQ QUESTIONS 1-9: 0
2. FEELING DOWN, DEPRESSED OR HOPELESS: NOT AT ALL
SUM OF ALL RESPONSES TO PHQ QUESTIONS 1-9: 0
SUM OF ALL RESPONSES TO PHQ9 QUESTIONS 1 & 2: 0
1. LITTLE INTEREST OR PLEASURE IN DOING THINGS: NOT AT ALL
SUM OF ALL RESPONSES TO PHQ QUESTIONS 1-9: 0
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
## 2025-01-14 ENCOUNTER — PATIENT MESSAGE (OUTPATIENT)
Dept: PRIMARY CARE CLINIC | Age: 60
End: 2025-01-14

## 2025-01-16 DIAGNOSIS — E04.1 THYROID NODULE: Primary | ICD-10-CM

## 2025-01-24 ENCOUNTER — HOSPITAL ENCOUNTER (OUTPATIENT)
Dept: ULTRASOUND IMAGING | Age: 60
Discharge: HOME | End: 2025-01-24
Payer: COMMERCIAL

## 2025-01-24 DIAGNOSIS — E04.1 THYROID NODULE: ICD-10-CM

## 2025-01-24 PROCEDURE — 76536 US EXAM OF HEAD AND NECK: CPT

## 2025-01-29 ENCOUNTER — PATIENT MESSAGE (OUTPATIENT)
Dept: PRIMARY CARE CLINIC | Age: 60
End: 2025-01-29

## 2025-01-29 DIAGNOSIS — R93.7 ABNORMAL MAGNETIC RESONANCE IMAGING OF LUMBAR SPINE: Primary | ICD-10-CM

## 2025-01-29 DIAGNOSIS — R93.89 ABNORMAL FINDING ON IMAGING: ICD-10-CM

## 2025-02-08 ENCOUNTER — HOSPITAL ENCOUNTER (OUTPATIENT)
Dept: MRI IMAGING | Age: 60
Discharge: HOME | End: 2025-02-08
Attending: FAMILY MEDICINE
Payer: OTHER GOVERNMENT

## 2025-02-08 DIAGNOSIS — R93.89 ABNORMAL FINDING ON IMAGING: ICD-10-CM

## 2025-02-08 PROCEDURE — 72148 MRI LUMBAR SPINE W/O DYE: CPT

## 2025-02-22 ENCOUNTER — HOSPITAL ENCOUNTER (OUTPATIENT)
Dept: MRI IMAGING | Age: 60
Discharge: HOME | End: 2025-02-22
Attending: FAMILY MEDICINE
Payer: OTHER GOVERNMENT

## 2025-02-22 DIAGNOSIS — R93.89 ABNORMAL FINDING ON IMAGING: ICD-10-CM

## 2025-02-22 PROCEDURE — 72146 MRI CHEST SPINE W/O DYE: CPT

## 2025-02-25 ENCOUNTER — PATIENT MESSAGE (OUTPATIENT)
Dept: PRIMARY CARE CLINIC | Age: 60
End: 2025-02-25

## 2025-02-25 SDOH — ECONOMIC STABILITY: INCOME INSECURITY: IN THE LAST 12 MONTHS, WAS THERE A TIME WHEN YOU WERE NOT ABLE TO PAY THE MORTGAGE OR RENT ON TIME?: NO

## 2025-02-25 SDOH — ECONOMIC STABILITY: FOOD INSECURITY: WITHIN THE PAST 12 MONTHS, YOU WORRIED THAT YOUR FOOD WOULD RUN OUT BEFORE YOU GOT MONEY TO BUY MORE.: NEVER TRUE

## 2025-02-25 SDOH — ECONOMIC STABILITY: FOOD INSECURITY: WITHIN THE PAST 12 MONTHS, THE FOOD YOU BOUGHT JUST DIDN'T LAST AND YOU DIDN'T HAVE MONEY TO GET MORE.: NEVER TRUE

## 2025-02-25 NOTE — TELEPHONE ENCOUNTER
Called Radiology regarding .[phone] spoke to Chasity sent to [place] [facility] location where pt. had scan done [phone] left message to return call to clinic.

## 2025-02-25 NOTE — TELEPHONE ENCOUNTER
Please call radiology to see when results will be available.   80 y/o female with history of HTN, HLD, DM2, likely CKD III, Hypothyroidism & Thyroid ca s/p surgeryx3 20+yrs ago & mets to R lung status [place] [facility] [place]u in [place] and found to have a possible new mass on R and is scheduled for Bronch on coming Tuesday who p/w  post obstructive PNA. She had another episode of respiratory distress this morning that resolved w/ BiPAP.    MEDICATIONS  (STANDING):  ALBUTerol    90 MICROgram(s) HFA Inhaler 1 Puff(s) Inhalation every 4 hours  albuterol/ipratropium for Nebulization 3 milliLiter(s) Nebulizer every 6 hours  azithromycin  IVPB 500 milliGRAM(s) IV Intermittent every 24 hours  dextrose 5%. 1000 milliLiter(s) (50 mL/Hr) IV Continuous <Continuous>  dextrose 50% Injectable 12.5 Gram(s) IV Push once  dextrose 50% Injectable 25 Gram(s) IV Push once  dextrose 50% Injectable 25 Gram(s) IV Push once  gabapentin 300 milliGRAM(s) Oral two times a day  insulin lispro (HumaLOG) corrective regimen sliding scale   SubCutaneous three times a day before meals  insulin lispro (HumaLOG) corrective regimen sliding scale   SubCutaneous at bedtime  letrozole 2.5 milliGRAM(s) Oral daily  levothyroxine 137 MICROGram(s) Oral daily  losartan 100 milliGRAM(s) Oral daily  methylPREDNISolone sodium succinate Injectable 20 milliGRAM(s) IV Push every 8 hours  pantoprazole    Tablet 40 milliGRAM(s) Oral before breakfast  piperacillin/tazobactam IVPB.. 3.375 Gram(s) IV Intermittent every 8 hours  tiotropium 18 MICROgram(s) Capsule 1 Capsule(s) Inhalation daily    MEDICATIONS  (PRN):  dextrose 40% Gel 15 Gram(s) Oral once PRN Blood Glucose LESS THAN 70 milliGRAM(s)/deciliter  glucagon  Injectable 1 milliGRAM(s) IntraMuscular once PRN Glucose LESS THAN 70 milligrams/deciliter      Allergies    sulfa drugs (Unknown)    Intolerances        Vital Signs Last 24 Hrs  T(C): 36.8 (12 Nov 2019 05:28), Max: 37.3 (11 Nov 2019 12:17)  T(F): 98.2 (12 Nov 2019 05:28), Max: 99.1 (11 Nov 2019 12:17)  HR: 100 (12 Nov 2019 06:41) (94 - 107)  BP: 154/77 (12 Nov 2019 05:28) (143/92 - 154/77)  BP(mean): --  RR: 17 (12 Nov 2019 05:28) (16 - 18)  SpO2: 98% (12 Nov 2019 06:41) (97% - 98%)    PHYSICAL EXAM:  GENERAL: NAD, well-groomed, well-developed  HEAD:  Atraumatic, Normocephalic  EYES: EOMI, PERRLA   NECK: Supple   NERVOUS SYSTEM:  Alert    CHEST/LUNG: Clear to auscultation bilaterally; No rales, rhonchi, wheezing, or rubs  HEART: Regular rate and rhythm; No murmurs, rubs, or gallops  ABDOMEN: Soft, Nontender, Nondistended; Bowel sounds present  EXTREMITIES:  No clubbing, cyanosis, or edema           MEDICATIONS  (STANDING):  acetylcysteine 10%  Inhalation 2 milliLiter(s) Inhalation three times a day  ALBUTerol    90 MICROgram(s) HFA Inhaler 1 Puff(s) Inhalation every 4 hours  albuterol/ipratropium for Nebulization. 3 milliLiter(s) Nebulizer every 8 hours  azithromycin  IVPB 500 milliGRAM(s) IV Intermittent every 24 hours  dextrose 5%. 1000 milliLiter(s) (50 mL/Hr) IV Continuous <Continuous>  dextrose 50% Injectable 12.5 Gram(s) IV Push once  dextrose 50% Injectable 25 Gram(s) IV Push once  dextrose 50% Injectable 25 Gram(s) IV Push once  enoxaparin Injectable 40 milliGRAM(s) SubCutaneous daily  gabapentin 300 milliGRAM(s) Oral two times a day  insulin lispro (HumaLOG) corrective regimen sliding scale   SubCutaneous three times a day before meals  insulin lispro (HumaLOG) corrective regimen sliding scale   SubCutaneous at bedtime  letrozole 2.5 milliGRAM(s) Oral daily  levothyroxine 137 MICROGram(s) Oral daily  losartan 100 milliGRAM(s) Oral daily  methylPREDNISolone sodium succinate Injectable 40 milliGRAM(s) IV Push every 8 hours  pantoprazole    Tablet 40 milliGRAM(s) Oral before breakfast  piperacillin/tazobactam IVPB.. 3.375 Gram(s) IV Intermittent every 8 hours  tiotropium 18 MICROgram(s) Capsule 1 Capsule(s) Inhalation daily    MEDICATIONS  (PRN):  dextrose 40% Gel 15 Gram(s) Oral once PRN Blood Glucose LESS THAN 70 milliGRAM(s)/deciliter  glucagon  Injectable 1 milliGRAM(s) IntraMuscular once PRN Glucose LESS THAN 70 milligrams/deciliter      Allergies    sulfa drugs (Unknown)    Intolerances        Vital Signs Last 24 Hrs  T(C): 36.2 (13 Nov 2019 17:16), Max: 37.2 (13 Nov 2019 11:56)  T(F): 97.1 (13 Nov 2019 17:16), Max: 99 (13 Nov 2019 11:56)  HR: 119 (13 Nov 2019 17:16) (94 - 130)  BP: 132/85 (13 Nov 2019 17:16) (124/54 - 175/92)  BP(mean): --  RR: 18 (13 Nov 2019 17:16) (18 - 19)  SpO2: 97% (13 Nov 2019 17:16) (95% - 99%)    PHYSICAL EXAM:  GENERAL: NAD, well-groomed, well-developed  HEAD:  Atraumatic, Normocephalic  EYES: EOMI, PERRLA, conjunctiva and sclera clear  ENMT: No tonsillar erythema, exudates, or enlargement; Moist mucous membranes, Good dentition, No lesions  NECK: Supple, No JVD, Normal thyroid  NERVOUS SYSTEM:  Alert & Oriented X3, Good concentration; Motor Strength 5/5 B/L upper and lower extremities; DTRs 2+ intact and symmetric  CHEST/LUNG: Clear to auscultation bilaterally; No rales, rhonchi, wheezing, or rubs  HEART: Regular rate and rhythm; No murmurs, rubs, or gallops  ABDOMEN: Soft, Nontender, Nondistended; Bowel sounds present  EXTREMITIES:  2+ Peripheral Pulses, No clubbing, cyanosis, or edema  LYMPH: No lymphadenopathy noted  SKIN: No rashes or lesions    LABS:                        10.4   9.39  )-----------( 341      ( 13 Nov 2019 06:23 )             32.0     11-13    137  |  102  |  31<H>  ----------------------------<  352<H>  4.1   |  27  |  1.27    Ca    9.8      13 Nov 2019 06:23  Phos  3.1     11-13  Mg     2.2     11-13          CAPILLARY BLOOD GLUCOSE      POCT Blood Glucose.: 336 mg/dL (13 Nov 2019 17:08)  POCT Blood Glucose.: 255 mg/dL (13 Nov 2019 13:30)  POCT Blood Glucose.: 388 mg/dL (13 Nov 2019 11:01)  POCT Blood Glucose.: 391 mg/dL (13 Nov 2019 07:07)  POCT Blood Glucose.: 459 mg/dL (12 Nov 2019 22:58)  POCT Blood Glucose.: 438 mg/dL (12 Nov 2019 22:57)      Culture - Urine (collected 10 Nov 2019 11:08)  Source: .Urine Clean Catch (Midstream)  Final Report (11 Nov 2019 07:47):    <10,000 CFU/mL Normal Urogenital Iram    Culture - Blood (collected 10 Nov 2019 00:46)  Source: .Blood Blood  Preliminary Report (11 Nov 2019 01:07):    No growth to date.    Culture - Blood (collected 10 Nov 2019 00:46)  Source: .Blood Blood  Preliminary Report (11 Nov 2019 01:07):    No growth to date.      RADIOLOGY & ADDITIONAL TESTS:    11-12-19 @ 07:01  -  11-13-19 @ 07:00  --------------------------------------------------------  IN:    Oral Fluid: 240 mL  Total IN: 240 mL    OUT:    Voided: 1000 mL  Total OUT: 1000 mL    Total NET: -760 mL

## 2025-02-26 NOTE — TELEPHONE ENCOUNTER
Spoke to Kaela [phone] stated scan was done on a Saturday they are still currently processing scans for 2/22/25, usually takes 3-4 business days.    Patient notified via [employer]!

## 2025-03-03 ENCOUNTER — OFFICE VISIT (OUTPATIENT)
Dept: PRIMARY CARE CLINIC | Age: 60
End: 2025-03-03
Payer: COMMERCIAL

## 2025-03-03 VITALS
HEART RATE: 99 BPM | SYSTOLIC BLOOD PRESSURE: 96 MMHG | OXYGEN SATURATION: 100 % | TEMPERATURE: 98.2 F | RESPIRATION RATE: 14 BRPM | WEIGHT: 139 LBS | DIASTOLIC BLOOD PRESSURE: 78 MMHG | HEIGHT: 66 IN | BODY MASS INDEX: 22.34 KG/M2

## 2025-03-03 DIAGNOSIS — E04.1 LEFT THYROID NODULE: ICD-10-CM

## 2025-03-03 DIAGNOSIS — Z78.0 POSTMENOPAUSAL: ICD-10-CM

## 2025-03-03 DIAGNOSIS — S22.20XS CLOSED FRACTURE OF STERNUM, UNSPECIFIED PORTION OF STERNUM, SEQUELA: Primary | ICD-10-CM

## 2025-03-03 DIAGNOSIS — M79.606 LOW BACK PAIN RADIATING DOWN LEG: ICD-10-CM

## 2025-03-03 DIAGNOSIS — S22.030A COMPRESSION FRACTURE OF T3 VERTEBRA, INITIAL ENCOUNTER (HCC): ICD-10-CM

## 2025-03-03 DIAGNOSIS — M54.50 LOW BACK PAIN RADIATING DOWN LEG: ICD-10-CM

## 2025-03-03 DIAGNOSIS — M25.512 ACUTE PAIN OF LEFT SHOULDER: ICD-10-CM

## 2025-03-03 DIAGNOSIS — M48.061 SPINAL STENOSIS OF LUMBAR REGION WITHOUT NEUROGENIC CLAUDICATION: ICD-10-CM

## 2025-03-03 PROCEDURE — 3074F SYST BP LT 130 MM HG: CPT | Performed by: FAMILY MEDICINE

## 2025-03-03 PROCEDURE — 99214 OFFICE O/P EST MOD 30 MIN: CPT | Performed by: FAMILY MEDICINE

## 2025-03-03 PROCEDURE — 3078F DIAST BP <80 MM HG: CPT | Performed by: FAMILY MEDICINE
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
## 2025-03-11 ENCOUNTER — HOSPITAL ENCOUNTER (OUTPATIENT)
Dept: PHYSICAL THERAPY | Age: 60
Setting detail: THERAPIES SERIES
Discharge: HOME | End: 2025-03-11
Payer: OTHER GOVERNMENT

## 2025-03-11 DIAGNOSIS — G89.29 CHRONIC MIDLINE LOW BACK PAIN WITHOUT SCIATICA: Primary | ICD-10-CM

## 2025-03-11 DIAGNOSIS — R19.8 ABDOMINAL WEAKNESS: ICD-10-CM

## 2025-03-11 DIAGNOSIS — R29.898 HIP WEAKNESS: ICD-10-CM

## 2025-03-11 DIAGNOSIS — M54.50 CHRONIC MIDLINE LOW BACK PAIN WITHOUT SCIATICA: Primary | ICD-10-CM

## 2025-03-11 PROCEDURE — 97110 THERAPEUTIC EXERCISES: CPT | Performed by: PHYSICAL THERAPIST

## 2025-03-11 PROCEDURE — 97140 MANUAL THERAPY 1/> REGIONS: CPT | Performed by: PHYSICAL THERAPIST

## 2025-03-11 PROCEDURE — 97161 PT EVAL LOW COMPLEX 20 MIN: CPT | Performed by: PHYSICAL THERAPIST

## 2025-03-11 NOTE — PLAN OF CARE
[facility] - Outpatient Rehabilitation and Therapy: [address] [place] [address] office: [phone] fax: [phone]     Physical Therapy Initial Evaluation Certification      Dear Jacey Navarro MD ,    We had the pleasure of evaluating the following patient for physical therapy services at [facility] Outpatient Physical Therapy.  A summary of our findings can be found in the initial assessment below.  This includes our plan of care.  If you have any questions or concerns regarding these findings, please do not hesitate to contact me at the office phone number listed above.  Thank you for the referral.     Physician Signature:_______________________________Date:__________________  By signing above (or electronic signature), therapist’s plan is approved by physician       Physical Therapy: TREATMENT/PROGRESS NOTE   Patient: Ximena Natarajan (59 y.o. female)   Examination Date: 2025   :  1965 MRN: [id number]   Visit #: 1   Insurance Allowable Auth Needed   MN []Yes    [x]No    Insurance: Payor:  EAST / Plan:  EAST PRIME / Product Type: *No Product type* /   Insurance ID: [id number] - (Other)  Secondary Insurance (if applicable): KINJAL CRANE*   Treatment Diagnosis:     ICD-10-CM    1. Chronic midline low back pain without sciatica  M54.50     G89.29       2. Abdominal weakness  R19.8       3. Hip weakness  R29.898          Medical Diagnosis:  Spinal stenosis of lumbar region without neurogenic claudication [M48.061]  Low back pain radiating down leg [M54.50, M79.606]  Acute pain of left shoulder [M25.512]   Referring Physician: Jacey Navarro MD  PCP: Jacey Navarro MD     Plan of care signed (Y/N):     Date of Patient follow up with Physician:      Plan of Care Report: EVAL today  POC update due: (10 visits /OR AUTH LIMITS, whichever is less)  4/10/2025                                             Medical

## 2025-03-12 ENCOUNTER — HOSPITAL ENCOUNTER (OUTPATIENT)
Dept: GENERAL RADIOLOGY | Age: 60
Discharge: HOME | End: 2025-03-12
Attending: FAMILY MEDICINE
Payer: COMMERCIAL

## 2025-03-12 DIAGNOSIS — Z78.0 POSTMENOPAUSAL: ICD-10-CM

## 2025-03-12 DIAGNOSIS — S22.030A COMPRESSION FRACTURE OF T3 VERTEBRA, INITIAL ENCOUNTER (HCC): ICD-10-CM

## 2025-03-12 PROCEDURE — 77080 DXA BONE DENSITY AXIAL: CPT

## 2025-03-18 ENCOUNTER — RESULTS FOLLOW-UP (OUTPATIENT)
Dept: GENERAL RADIOLOGY | Age: 60
End: 2025-03-18

## 2025-03-18 DIAGNOSIS — M81.6 LOCALIZED OSTEOPOROSIS WITHOUT CURRENT PATHOLOGICAL FRACTURE: Primary | ICD-10-CM

## 2025-03-18 RX ORDER — ALENDRONATE SODIUM 70 MG/1
70 TABLET ORAL
Qty: 13 TABLET | Refills: 0 | Status: SHIPPED | OUTPATIENT
Start: 2025-03-18 | End: 2025-04-14 | Stop reason: SDUPTHER

## 2025-03-19 ENCOUNTER — HOSPITAL ENCOUNTER (OUTPATIENT)
Dept: PHYSICAL THERAPY | Age: 60
Setting detail: THERAPIES SERIES
Discharge: HOME | End: 2025-03-19
Payer: OTHER GOVERNMENT

## 2025-03-19 PROCEDURE — 97110 THERAPEUTIC EXERCISES: CPT

## 2025-03-19 PROCEDURE — 97140 MANUAL THERAPY 1/> REGIONS: CPT
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
## 2025-03-21 ENCOUNTER — HOSPITAL ENCOUNTER (OUTPATIENT)
Dept: PHYSICAL THERAPY | Age: 60
Setting detail: THERAPIES SERIES
Discharge: HOME | End: 2025-03-21
Payer: OTHER GOVERNMENT

## 2025-03-21 PROCEDURE — 97140 MANUAL THERAPY 1/> REGIONS: CPT

## 2025-03-21 PROCEDURE — 97110 THERAPEUTIC EXERCISES: CPT
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
## 2025-03-26 ENCOUNTER — HOSPITAL ENCOUNTER (OUTPATIENT)
Dept: PHYSICAL THERAPY | Age: 60
Setting detail: THERAPIES SERIES
End: 2025-03-26
Payer: OTHER GOVERNMENT

## 2025-03-28 ENCOUNTER — APPOINTMENT (OUTPATIENT)
Dept: PHYSICAL THERAPY | Age: 60
End: 2025-03-28
Payer: OTHER GOVERNMENT

## 2025-04-10 ENCOUNTER — RESULTS FOLLOW-UP (OUTPATIENT)
Dept: PRIMARY CARE CLINIC | Age: 60
End: 2025-04-10

## 2025-04-14 ENCOUNTER — OFFICE VISIT (OUTPATIENT)
Dept: PRIMARY CARE CLINIC | Age: 60
End: 2025-04-14
Payer: OTHER GOVERNMENT

## 2025-04-14 VITALS
RESPIRATION RATE: 14 BRPM | HEART RATE: 67 BPM | SYSTOLIC BLOOD PRESSURE: 108 MMHG | TEMPERATURE: 97.9 F | DIASTOLIC BLOOD PRESSURE: 72 MMHG | OXYGEN SATURATION: 99 % | BODY MASS INDEX: 23.29 KG/M2 | HEIGHT: 65 IN | WEIGHT: 139.8 LBS

## 2025-04-14 DIAGNOSIS — G47.9 SLEEP DIFFICULTIES: ICD-10-CM

## 2025-04-14 DIAGNOSIS — I10 ESSENTIAL HYPERTENSION: ICD-10-CM

## 2025-04-14 DIAGNOSIS — Z00.00 WELL ADULT EXAM: Primary | ICD-10-CM

## 2025-04-14 DIAGNOSIS — M48.061 SPINAL STENOSIS OF LUMBAR REGION WITHOUT NEUROGENIC CLAUDICATION: ICD-10-CM

## 2025-04-14 DIAGNOSIS — S22.030A COMPRESSION FRACTURE OF T3 VERTEBRA, INITIAL ENCOUNTER (HCC): ICD-10-CM

## 2025-04-14 DIAGNOSIS — S22.20XS CLOSED FRACTURE OF STERNUM, UNSPECIFIED PORTION OF STERNUM, SEQUELA: ICD-10-CM

## 2025-04-14 DIAGNOSIS — M80.00XA AGE-RELATED OSTEOPOROSIS WITH CURRENT PATHOLOGICAL FRACTURE, INITIAL ENCOUNTER: ICD-10-CM

## 2025-04-14 DIAGNOSIS — E04.1 LEFT THYROID NODULE: ICD-10-CM

## 2025-04-14 DIAGNOSIS — E78.00 HYPERCHOLESTEREMIA: ICD-10-CM

## 2025-04-14 DIAGNOSIS — Z00.00 WELL ADULT EXAM: ICD-10-CM

## 2025-04-14 LAB
25(OH)D3 SERPL-MCNC: 84.6 NG/ML
ALBUMIN SERPL-MCNC: 4.4 G/DL (ref 3.4–5)
ALBUMIN/GLOB SERPL: 1.8 {RATIO} (ref 1.1–2.2)
ALP SERPL-CCNC: 86 U/L (ref 40–129)
ALT SERPL-CCNC: 38 U/L (ref 10–40)
ANION GAP SERPL CALCULATED.3IONS-SCNC: 10 MMOL/L (ref 3–16)
AST SERPL-CCNC: 37 U/L (ref 15–37)
BASOPHILS # BLD: 0 K/UL (ref 0–0.2)
BASOPHILS NFR BLD: 0.5 %
BILIRUB SERPL-MCNC: 0.5 MG/DL (ref 0–1)
BUN SERPL-MCNC: 18 MG/DL (ref 7–20)
CALCIUM SERPL-MCNC: 9.4 MG/DL (ref 8.3–10.6)
CHLORIDE SERPL-SCNC: 101 MMOL/L (ref 99–110)
CHOLEST SERPL-MCNC: 134 MG/DL (ref 0–199)
CO2 SERPL-SCNC: 27 MMOL/L (ref 21–32)
CREAT SERPL-MCNC: 0.8 MG/DL (ref 0.6–1.1)
DEPRECATED RDW RBC AUTO: 14.1 % (ref 12.4–15.4)
EOSINOPHIL # BLD: 0.1 K/UL (ref 0–0.6)
EOSINOPHIL NFR BLD: 1.5 %
GFR SERPLBLD CREATININE-BSD FMLA CKD-EPI: 86 ML/MIN/{1.73_M2}
GLUCOSE SERPL-MCNC: 86 MG/DL (ref 70–99)
HCT VFR BLD AUTO: 42.9 % (ref 36–48)
HDLC SERPL-MCNC: 72 MG/DL (ref 40–60)
HGB BLD-MCNC: 14.4 G/DL (ref 12–16)
LDLC SERPL CALC-MCNC: 43 MG/DL
LYMPHOCYTES # BLD: 2.2 K/UL (ref 1–5.1)
LYMPHOCYTES NFR BLD: 27.9 %
MCH RBC QN AUTO: 30.2 PG (ref 26–34)
MCHC RBC AUTO-ENTMCNC: 33.6 G/DL (ref 31–36)
MCV RBC AUTO: 89.7 FL (ref 80–100)
MONOCYTES # BLD: 0.5 K/UL (ref 0–1.3)
MONOCYTES NFR BLD: 6.4 %
NEUTROPHILS # BLD: 5 K/UL (ref 1.7–7.7)
NEUTROPHILS NFR BLD: 63.7 %
PLATELET # BLD AUTO: 164 K/UL (ref 135–450)
PMV BLD AUTO: 10.1 FL (ref 5–10.5)
POTASSIUM SERPL-SCNC: 4.2 MMOL/L (ref 3.5–5.1)
PROT SERPL-MCNC: 6.9 G/DL (ref 6.4–8.2)
RBC # BLD AUTO: 4.78 M/UL (ref 4–5.2)
SODIUM SERPL-SCNC: 138 MMOL/L (ref 136–145)
TRIGL SERPL-MCNC: 94 MG/DL (ref 0–150)
TSH SERPL DL<=0.005 MIU/L-ACNC: 2.66 UIU/ML (ref 0.27–4.2)
VLDLC SERPL CALC-MCNC: 19 MG/DL
WBC # BLD AUTO: 7.9 K/UL (ref 4–11)

## 2025-04-14 PROCEDURE — 99396 PREV VISIT EST AGE 40-64: CPT | Performed by: FAMILY MEDICINE

## 2025-04-14 PROCEDURE — 3074F SYST BP LT 130 MM HG: CPT | Performed by: FAMILY MEDICINE

## 2025-04-14 PROCEDURE — 3078F DIAST BP <80 MM HG: CPT | Performed by: FAMILY MEDICINE

## 2025-04-14 RX ORDER — TRAZODONE HYDROCHLORIDE 50 MG/1
25-50 TABLET ORAL NIGHTLY PRN
Qty: 30 TABLET | Refills: 1 | Status: SHIPPED | OUTPATIENT
Start: 2025-04-14

## 2025-04-14 RX ORDER — ALENDRONATE SODIUM 70 MG/1
70 TABLET ORAL
Qty: 13 TABLET | Refills: 0 | Status: SHIPPED | OUTPATIENT
Start: 2025-04-14

## 2025-04-14 ASSESSMENT — ENCOUNTER SYMPTOMS
ABDOMINAL PAIN: 0
TROUBLE SWALLOWING: 0
SINUS PRESSURE: 0
DIARRHEA: 0
BACK PAIN: 0
SORE THROAT: 0
COUGH: 0
VOMITING: 0
NAUSEA: 0
SINUS PAIN: 0
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
## 2025-04-15 LAB
EST. AVERAGE GLUCOSE BLD GHB EST-MCNC: 102.5 MG/DL
HBA1C MFR BLD: 5.2 %

## 2025-04-17 ENCOUNTER — RESULTS FOLLOW-UP (OUTPATIENT)
Dept: PRIMARY CARE CLINIC | Age: 60
End: 2025-04-17

## 2025-04-28 ENCOUNTER — PATIENT MESSAGE (OUTPATIENT)
Dept: PRIMARY CARE CLINIC | Age: 60
End: 2025-04-28

## 2025-05-02 ENCOUNTER — OFFICE VISIT (OUTPATIENT)
Dept: PRIMARY CARE CLINIC | Age: 60
End: 2025-05-02

## 2025-05-02 VITALS
BODY MASS INDEX: 22.76 KG/M2 | WEIGHT: 141.6 LBS | HEIGHT: 66 IN | HEART RATE: 87 BPM | SYSTOLIC BLOOD PRESSURE: 108 MMHG | OXYGEN SATURATION: 97 % | TEMPERATURE: 98.2 F | RESPIRATION RATE: 14 BRPM | DIASTOLIC BLOOD PRESSURE: 70 MMHG

## 2025-05-02 DIAGNOSIS — R20.2 FACIAL PARESTHESIA: ICD-10-CM

## 2025-05-02 DIAGNOSIS — R20.2 FACIAL PARESTHESIA: Primary | ICD-10-CM

## 2025-05-02 LAB
ALBUMIN SERPL-MCNC: 4.7 G/DL (ref 3.4–5)
ALBUMIN/GLOB SERPL: 1.8 {RATIO} (ref 1.1–2.2)
ALP SERPL-CCNC: 89 U/L (ref 40–129)
ALT SERPL-CCNC: 46 U/L (ref 10–40)
ANION GAP SERPL CALCULATED.3IONS-SCNC: 10 MMOL/L (ref 3–16)
AST SERPL-CCNC: 39 U/L (ref 15–37)
BASOPHILS # BLD: 0 K/UL (ref 0–0.2)
BASOPHILS NFR BLD: 0.3 %
BILIRUB SERPL-MCNC: 0.3 MG/DL (ref 0–1)
BUN SERPL-MCNC: 25 MG/DL (ref 7–20)
CALCIUM SERPL-MCNC: 9.9 MG/DL (ref 8.3–10.6)
CHLORIDE SERPL-SCNC: 105 MMOL/L (ref 99–110)
CO2 SERPL-SCNC: 26 MMOL/L (ref 21–32)
CREAT SERPL-MCNC: 0.8 MG/DL (ref 0.6–1.1)
DEPRECATED RDW RBC AUTO: 14.2 % (ref 12.4–15.4)
EOSINOPHIL # BLD: 0.1 K/UL (ref 0–0.6)
EOSINOPHIL NFR BLD: 1.4 %
FOLATE SERPL-MCNC: 19.7 NG/ML (ref 4.78–24.2)
GFR SERPLBLD CREATININE-BSD FMLA CKD-EPI: 86 ML/MIN/{1.73_M2}
GLUCOSE SERPL-MCNC: 92 MG/DL (ref 70–99)
HCT VFR BLD AUTO: 43.2 % (ref 36–48)
HGB BLD-MCNC: 14.6 G/DL (ref 12–16)
LYMPHOCYTES # BLD: 2 K/UL (ref 1–5.1)
LYMPHOCYTES NFR BLD: 25 %
MCH RBC QN AUTO: 29.9 PG (ref 26–34)
MCHC RBC AUTO-ENTMCNC: 33.8 G/DL (ref 31–36)
MCV RBC AUTO: 88.4 FL (ref 80–100)
MONOCYTES # BLD: 0.6 K/UL (ref 0–1.3)
MONOCYTES NFR BLD: 8 %
NEUTROPHILS # BLD: 5.2 K/UL (ref 1.7–7.7)
NEUTROPHILS NFR BLD: 65.3 %
PLATELET # BLD AUTO: 175 K/UL (ref 135–450)
PMV BLD AUTO: 9.4 FL (ref 5–10.5)
POTASSIUM SERPL-SCNC: 4.4 MMOL/L (ref 3.5–5.1)
PROT SERPL-MCNC: 7.3 G/DL (ref 6.4–8.2)
RBC # BLD AUTO: 4.88 M/UL (ref 4–5.2)
SODIUM SERPL-SCNC: 141 MMOL/L (ref 136–145)
VIT B12 SERPL-MCNC: 1191 PG/ML (ref 211–911)
WBC # BLD AUTO: 8 K/UL (ref 4–11)

## 2025-05-02 RX ORDER — CARBAMAZEPINE 100 MG/1
100 TABLET, EXTENDED RELEASE ORAL 2 TIMES DAILY
Qty: 60 TABLET | Refills: 0 | Status: SHIPPED | OUTPATIENT
Start: 2025-05-02

## 2025-05-02 NOTE — PROGRESS NOTES
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     Comment: takes care of Horse   Tobacco Use    Smoking status: Never    Smokeless tobacco: Never   Substance and Sexual Activity    Alcohol use: No    Drug use: Never    Sexual activity: Yes     Partners: Male     Comment: M ( 2 nd )+two kids 22 &26 yo   Other Topics Concern    Not on file   Social History Narrative         FT job    2 children 34 & 30    4 G children 2 biological    Stay Home     Horses     Social Drivers of Health     Financial Resource Strain: Low Risk  (4/11/2024)    Overall Financial Resource Strain (CARDIA)     Difficulty of Paying Living Expenses: Not hard at all   Food Insecurity: No Food Insecurity (2/25/2025)    Hunger Vital Sign     Worried About Running Out of Food in the Last Year: Never true     Ran Out of Food in the Last Year: Never true   Transportation Needs: No Transportation Needs (2/25/2025)    PRAPARE - Transportation     Lack of Transportation (Medical): No     Lack of Transportation (Non-Medical): No   Physical Activity: Sufficiently Active (4/8/2024)    Exercise Vital Sign     Days of Exercise per Week: 5 days     Minutes of Exercise per Session: 40 min   Stress: No Stress Concern Present (1/31/2022)    [employer] - Occupational Stress Questionnaire     Feeling of Stress : Not at all   Social Connections: Moderately Integrated (1/31/2022)    Social

## 2025-05-05 ENCOUNTER — RESULTS FOLLOW-UP (OUTPATIENT)
Dept: PRIMARY CARE CLINIC | Age: 60
End: 2025-05-05

## 2025-05-06 ENCOUNTER — TELEPHONE (OUTPATIENT)
Dept: PRIMARY CARE CLINIC | Age: 60
End: 2025-05-06

## 2025-05-06 NOTE — TELEPHONE ENCOUNTER
Have filled out  referral form and faxed with referral will scan into chart once get conformation

## 2025-05-06 NOTE — TELEPHONE ENCOUNTER
Patient is calling and stating that they received a referral to see Neurology and that the referral needs to be sent to  and then [facility] will send it to the dr's office.  is the patient's insurance. Please advise

## 2025-05-07 ENCOUNTER — HOSPITAL ENCOUNTER (OUTPATIENT)
Dept: MRI IMAGING | Age: 60
Discharge: HOME | End: 2025-05-07
Payer: OTHER GOVERNMENT

## 2025-05-07 DIAGNOSIS — R20.2 FACIAL PARESTHESIA: ICD-10-CM

## 2025-05-07 PROCEDURE — 6360000004 HC RX CONTRAST MEDICATION

## 2025-05-07 PROCEDURE — 70553 MRI BRAIN STEM W/O & W/DYE: CPT

## 2025-05-07 PROCEDURE — A9579 GAD-BASE MR CONTRAST NOS,1ML: HCPCS

## 2025-05-07 RX ADMIN — GADOTERIDOL 12 ML: 279.3 INJECTION, SOLUTION INTRAVENOUS at 10:48

## 2025-05-19 ENCOUNTER — OFFICE VISIT (OUTPATIENT)
Dept: PRIMARY CARE CLINIC | Age: 60
End: 2025-05-19
Payer: OTHER GOVERNMENT

## 2025-05-19 VITALS
WEIGHT: 145 LBS | DIASTOLIC BLOOD PRESSURE: 72 MMHG | RESPIRATION RATE: 16 BRPM | HEART RATE: 83 BPM | BODY MASS INDEX: 23.76 KG/M2 | SYSTOLIC BLOOD PRESSURE: 108 MMHG | OXYGEN SATURATION: 98 %

## 2025-05-19 DIAGNOSIS — R20.2 FACIAL PARESTHESIA: ICD-10-CM

## 2025-05-19 DIAGNOSIS — G50.0 TRIGEMINAL NEURALGIA OF LEFT SIDE OF FACE: Primary | ICD-10-CM

## 2025-05-19 PROCEDURE — 99213 OFFICE O/P EST LOW 20 MIN: CPT

## 2025-05-19 RX ORDER — GABAPENTIN 300 MG/1
300 CAPSULE ORAL ONCE
Qty: 1 CAPSULE | Refills: 0 | Status: SHIPPED | OUTPATIENT
Start: 2025-05-19 | End: 2025-05-20 | Stop reason: ALTCHOICE

## 2025-05-19 RX ORDER — CARBAMAZEPINE 100 MG/1
200 TABLET, EXTENDED RELEASE ORAL 2 TIMES DAILY
Qty: 120 TABLET | Refills: 3 | Status: SHIPPED | OUTPATIENT
Start: 2025-05-19

## 2025-05-19 NOTE — PATIENT INSTRUCTIONS
Take Carbamezapine 200 mg twice daily for one week  300 mg twice daily for second week.  After that you can add Gabapentin once nightly.

## 2025-05-19 NOTE — PROGRESS NOTES
PROGRESS NOTE   [facility]                                  [address], [address], [facility] [address]         Phone: [phone]    Date of Service:  5/19/2025     Patient ID: Shayy Natarajan is a 59 y.o. female      Subjective:     CC: 40 follow-up of left-sided trigeminal neuralgia    HPI  59-year-old female with past medical history of hypertension, hypercholesterolemia, psoriasis, renal cyst, left thyroid cyst, age-related osteoporosis, closed fracture of sternum, spinal stenosis of lumbar region came in today with the above-mentioned complaints.    Left-sided trigeminal neuralgi lesion:  She presented 2 weeks ago with left-sided facial paresthesias including left-sided facial pain, electric shocklike sensations, tingling, numbness.  All the symptoms started earlier in March 2025 when she developed moderate pain on left side of her face.  She reported feeling pain originating from base of her left jaw and radiating towards ear, upper chest and frontal head.  She was advised to start carbamazepine 100 mg twice daily, referral to neurologist, and MRI brain.   She is taking her medication regularly and reported having 30% improvement in her symptoms.  Now her pain is 7/10, throbbing in nature, electric shocklike start, radiating to upper jaw, left ear and head.  She feels all right swallowing, chewing, denies any vision changes or hearing defect.  No associated vertigo, dizziness, headache.  Symptoms are associated with numbness on left side of her face.  She denies any triggering factor including chewing eating, touching or brushing her teeth.  She has a neurologist appointment scheduled for May 29, 2025.  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  She denies having any side effects of medications

## 2025-05-20 RX ORDER — GABAPENTIN 300 MG/1
300 CAPSULE ORAL DAILY
Qty: 30 CAPSULE | Refills: 0 | Status: SHIPPED | OUTPATIENT
Start: 2025-05-20 | End: 2025-06-19

## 2025-06-09 ENCOUNTER — OFFICE VISIT (OUTPATIENT)
Dept: PRIMARY CARE CLINIC | Age: 60
End: 2025-06-09
Payer: OTHER GOVERNMENT

## 2025-06-09 VITALS
OXYGEN SATURATION: 96 % | RESPIRATION RATE: 16 BRPM | SYSTOLIC BLOOD PRESSURE: 118 MMHG | BODY MASS INDEX: 23.63 KG/M2 | WEIGHT: 147 LBS | DIASTOLIC BLOOD PRESSURE: 76 MMHG | HEART RATE: 91 BPM | HEIGHT: 66 IN

## 2025-06-09 DIAGNOSIS — G50.0 TRIGEMINAL NEURALGIA OF LEFT SIDE OF FACE: Primary | ICD-10-CM

## 2025-06-09 DIAGNOSIS — M80.00XS AGE-RELATED OSTEOPOROSIS WITH CURRENT PATHOLOGICAL FRACTURE, SEQUELA: ICD-10-CM

## 2025-06-09 PROBLEM — M80.00XA AGE-RELATED OSTEOPOROSIS WITH CURRENT PATHOLOGICAL FRACTURE: Status: ACTIVE | Noted: 2025-06-09

## 2025-06-09 PROCEDURE — 99213 OFFICE O/P EST LOW 20 MIN: CPT | Performed by: FAMILY MEDICINE

## 2025-06-09 PROCEDURE — 3078F DIAST BP <80 MM HG: CPT | Performed by: FAMILY MEDICINE

## 2025-06-09 PROCEDURE — 3074F SYST BP LT 130 MM HG: CPT | Performed by: FAMILY MEDICINE

## 2025-06-09 NOTE — PROGRESS NOTES
Jacey Navarro MD  [facility]   [facility]   [address], [address]  [facility] [address]  Phone: [phone]  Fax: [phone]        SUBJECTIVE:  Ms. Natarajan is a 59 y.o. female here for the chief complaint below:    1. Trigeminal neuralgia of left side of face --patient was recently diagnosed with this.  Did establish with neurology within the last few weeks, and they changed her Tegretol to a extended release form.  Unfortunately patient did not tolerate the transition from short acting to long-acting.  The neurology team was updated on this intolerance, and pt has appt with them [facility] neuro.  Per pt, they states she's a good candidate for surgical tx for the trigeminal neuralgia.  Patient does have an appointment with a surgeon at [place] in a few weeks to discuss this.  Patient states overall has seen some improvement with the use of the Tegretol and gabapentin, but still with lingering constant dull achiness on the lower part of her jaw on the left side of her face, that worsens with eating.  Pt with some fatigue and weight gain with the tegretol and gabapentin but is still trying to exercise few days per wk.     2-osteoporosis--patient was just started on Fosamax few months ago.  So far is tolerating the medication without any side effects    Past Medical History:   Diagnosis Date    Hypercholesteremia     Hypertension     Kidney lesion, native, right 02/17/2021    Psoriasis         Past Surgical History:   Procedure Laterality Date    KNEE SURGERY Right 02/14/2019    Nimesh Jo /Ck    SARKIS AND BSO (CERVIX REMOVED)  1996    FHx Mother +ovarian cancer    THYROIDECTOMY, PARTIAL  2005    Nodule ,Benign Lt        Current Outpatient Medications   Medication Sig Dispense Refill    gabapentin (NEURONTIN) 300 MG capsule Take 1 capsule by mouth daily for 30 days. Intended supply: 90 days 30 capsule 0    carBAMazepine

## 2025-06-16 DIAGNOSIS — G50.0 TRIGEMINAL NEURALGIA OF LEFT SIDE OF FACE: ICD-10-CM

## 2025-06-16 RX ORDER — GABAPENTIN 300 MG/1
300 CAPSULE ORAL DAILY
Qty: 90 CAPSULE | Refills: 0 | Status: SHIPPED | OUTPATIENT
Start: 2025-06-16 | End: 2025-09-14

## 2025-06-27 DIAGNOSIS — M80.00XA AGE-RELATED OSTEOPOROSIS WITH CURRENT PATHOLOGICAL FRACTURE, INITIAL ENCOUNTER: ICD-10-CM

## 2025-06-27 NOTE — TELEPHONE ENCOUNTER
Refill Request       Last Seen: Last Seen Department: 6/9/2025  Last Seen by PCP: 6/9/2025    Last Written: 4/14/25 13 tabs 0 refills     Next Appointment:   Future Appointments   Date Time Provider Department Center   8/22/2025  1:30 PM Jacey Navarro MD [facility]             Requested Prescriptions     Pending Prescriptions Disp Refills    alendronate (FOSAMAX) 70 MG tablet [Pharmacy Med Name: ALENDRONATE SODIUM 70 MG TAB] 12 tablet      Sig: TAKE 1 TABLET BY MOUTH ONCE WEEKLY

## 2025-06-28 RX ORDER — ALENDRONATE SODIUM 70 MG/1
TABLET ORAL
Qty: 12 TABLET | Refills: 0 | Status: SHIPPED | OUTPATIENT
Start: 2025-06-28

## 2025-07-24 ENCOUNTER — OFFICE VISIT (OUTPATIENT)
Dept: PRIMARY CARE CLINIC | Age: 60
End: 2025-07-24
Payer: OTHER GOVERNMENT

## 2025-07-24 VITALS
WEIGHT: 146.8 LBS | HEART RATE: 78 BPM | DIASTOLIC BLOOD PRESSURE: 72 MMHG | OXYGEN SATURATION: 98 % | BODY MASS INDEX: 24.06 KG/M2 | SYSTOLIC BLOOD PRESSURE: 112 MMHG

## 2025-07-24 DIAGNOSIS — Z01.818 PREOP EXAMINATION: ICD-10-CM

## 2025-07-24 DIAGNOSIS — Z01.818 PREOP EXAMINATION: Primary | ICD-10-CM

## 2025-07-24 LAB
APTT BLD: 25.8 SEC (ref 22.8–35.8)
INR PPP: 1 (ref 0.86–1.14)
PROTHROMBIN TIME: 13.5 SEC (ref 12.1–14.9)

## 2025-07-24 PROCEDURE — 3078F DIAST BP <80 MM HG: CPT | Performed by: FAMILY MEDICINE

## 2025-07-24 PROCEDURE — 93000 ELECTROCARDIOGRAM COMPLETE: CPT | Performed by: FAMILY MEDICINE

## 2025-07-24 PROCEDURE — 3074F SYST BP LT 130 MM HG: CPT | Performed by: FAMILY MEDICINE

## 2025-07-24 PROCEDURE — 99214 OFFICE O/P EST MOD 30 MIN: CPT | Performed by: FAMILY MEDICINE

## 2025-07-24 ASSESSMENT — ENCOUNTER SYMPTOMS
CHEST TIGHTNESS: 0
SHORTNESS OF BREATH: 0
COUGH: 0
TROUBLE SWALLOWING: 0
NAUSEA: 0
VOMITING: 0
ABDOMINAL PAIN: 0
COLOR CHANGE: 0

## 2025-07-24 NOTE — PROGRESS NOTES
Jacey Navarro MD  [facility]   [facility]   [address], [address]  [facility] [address]  Phone: [phone]  Fax: [phone]    PREOP PHYSICAL    Ximena Natarajan is a 59 y.o. female who is referred by Dr. Bean  for a preoperative physical examination and medical clearance for surgery.      She is scheduled to have left microvascular decompression for trigeminal nerve at [facility] [place] on 8/13/25.    Ms. Natarajan has HTN    Patient has no history of PE, DVT, or clotting disorder.    Anesthesia History:  She has not had an adverse reaction to anesthesia in the past.  Family members who have had adverse reactions and or malignant hyperthermia to anesthesia include: none    Patient Active Problem List   Diagnosis    Hypertension    Eczema    Family history of ovarian cancer    Hypercholesteremia    Vitamin D deficiency    Pityriasis lichenoides    Liver cyst    Kidney lesion, native, right    Angiomyolipoma of right kidney    Nontoxic uninodular goiter    Age-related osteoporosis with current pathological fracture    Trigeminal neuralgia of left side of face       Past Surgical History:   Procedure Laterality Date    KNEE SURGERY Right 02/14/2019    Nimesh Jo /Ck    SARKIS AND BSO (CERVIX REMOVED)  1996    FHx Mother +ovarian cancer    THYROIDECTOMY, PARTIAL  2005    Nodule ,Benign Lt       Current Outpatient Medications   Medication Sig Dispense Refill    alendronate (FOSAMAX) 70 MG tablet TAKE 1 TABLET BY MOUTH ONCE WEEKLY 12 tablet 0    gabapentin (NEURONTIN) 300 MG capsule Take 1 capsule by mouth daily for 90 days. 90 capsule 0    carBAMazepine (TEGRETOL-XR) 100 MG extended release tablet Take 2 tablets by mouth 2 times daily 120 tablet 3    rosuvastatin (CRESTOR) 10 MG tablet Take 1 tablet by mouth daily 90 tablet 3    lisinopril (PRINIVIL;ZESTRIL) 5 MG tablet Take 1 tablet by mouth daily 90 tablet 3    cetirizine (ZYRTEC) 10 MG

## 2025-07-24 NOTE — PATIENT INSTRUCTIONS
--no motrin/ aleve/ ibuprofen 1 week prior to surgery  --no vitamins or supplements 1 week prior to surgery  --make sure to have stool softeners/ prune juice at home to avoid any constipation after surgery

## 2025-07-25 LAB
ANION GAP SERPL CALCULATED.3IONS-SCNC: 10 MMOL/L (ref 3–16)
BASOPHILS # BLD: 0 K/UL (ref 0–0.2)
BASOPHILS NFR BLD: 0.4 %
BUN SERPL-MCNC: 22 MG/DL (ref 7–20)
CALCIUM SERPL-MCNC: 8.9 MG/DL (ref 8.3–10.6)
CHLORIDE SERPL-SCNC: 99 MMOL/L (ref 99–110)
CO2 SERPL-SCNC: 26 MMOL/L (ref 21–32)
CREAT SERPL-MCNC: 0.7 MG/DL (ref 0.6–1.1)
DEPRECATED RDW RBC AUTO: 14.2 % (ref 12.4–15.4)
EOSINOPHIL # BLD: 0.1 K/UL (ref 0–0.6)
EOSINOPHIL NFR BLD: 1 %
GFR SERPLBLD CREATININE-BSD FMLA CKD-EPI: >90 ML/MIN/{1.73_M2}
GLUCOSE SERPL-MCNC: 89 MG/DL (ref 70–99)
HCT VFR BLD AUTO: 39 % (ref 36–48)
HGB BLD-MCNC: 13.5 G/DL (ref 12–16)
LYMPHOCYTES # BLD: 1.4 K/UL (ref 1–5.1)
LYMPHOCYTES NFR BLD: 21.6 %
MCH RBC QN AUTO: 30.8 PG (ref 26–34)
MCHC RBC AUTO-ENTMCNC: 34.7 G/DL (ref 31–36)
MCV RBC AUTO: 88.9 FL (ref 80–100)
MONOCYTES # BLD: 0.5 K/UL (ref 0–1.3)
MONOCYTES NFR BLD: 7.4 %
NEUTROPHILS # BLD: 4.6 K/UL (ref 1.7–7.7)
NEUTROPHILS NFR BLD: 69.6 %
PLATELET # BLD AUTO: 163 K/UL (ref 135–450)
PMV BLD AUTO: 8.9 FL (ref 5–10.5)
POTASSIUM SERPL-SCNC: 4 MMOL/L (ref 3.5–5.1)
RBC # BLD AUTO: 4.39 M/UL (ref 4–5.2)
SODIUM SERPL-SCNC: 135 MMOL/L (ref 136–145)
WBC # BLD AUTO: 6.7 K/UL (ref 4–11)

## 2025-08-07 RX ORDER — GABAPENTIN 100 MG/1
200 CAPSULE ORAL NIGHTLY
Status: ON HOLD | COMMUNITY
Start: 2025-06-26 | End: 2025-08-14 | Stop reason: HOSPADM

## 2025-08-07 RX ORDER — TRAZODONE HYDROCHLORIDE 50 MG/1
50 TABLET ORAL NIGHTLY
Status: ON HOLD | COMMUNITY
End: 2025-08-14 | Stop reason: ALTCHOICE

## 2025-08-13 ENCOUNTER — ANESTHESIA (OUTPATIENT)
Dept: OPERATING ROOM | Age: 60
End: 2025-08-13
Payer: OTHER GOVERNMENT

## 2025-08-13 ENCOUNTER — HOSPITAL ENCOUNTER (INPATIENT)
Age: 60
LOS: 1 days | Discharge: HOME | End: 2025-08-14
Attending: NEUROLOGICAL SURGERY | Admitting: NEUROLOGICAL SURGERY
Payer: OTHER GOVERNMENT

## 2025-08-13 ENCOUNTER — ANESTHESIA EVENT (OUTPATIENT)
Dept: OPERATING ROOM | Age: 60
End: 2025-08-13
Payer: OTHER GOVERNMENT

## 2025-08-13 ENCOUNTER — APPOINTMENT (OUTPATIENT)
Dept: CT IMAGING | Age: 60
End: 2025-08-13
Attending: NEUROLOGICAL SURGERY
Payer: OTHER GOVERNMENT

## 2025-08-13 DIAGNOSIS — Z98.890 S/P CRANIOTOMY: Primary | ICD-10-CM

## 2025-08-13 PROBLEM — G50.0 TRIGEMINAL NEURALGIA: Status: ACTIVE | Noted: 2025-08-13

## 2025-08-13 LAB
ABO/RH: NORMAL
ANTIBODY SCREEN: NORMAL
GLUCOSE BLD-MCNC: 112 MG/DL (ref 70–99)
GLUCOSE BLD-MCNC: 124 MG/DL (ref 70–99)
PERFORMED ON: ABNORMAL
PERFORMED ON: ABNORMAL

## 2025-08-13 PROCEDURE — 7100000000 HC PACU RECOVERY - FIRST 15 MIN: Performed by: NEUROLOGICAL SURGERY

## 2025-08-13 PROCEDURE — 6370000000 HC RX 637 (ALT 250 FOR IP): Performed by: NEUROLOGICAL SURGERY

## 2025-08-13 PROCEDURE — 6360000002 HC RX W HCPCS: Performed by: PHYSICIAN ASSISTANT

## 2025-08-13 PROCEDURE — 2580000003 HC RX 258: Performed by: PHYSICIAN ASSISTANT

## 2025-08-13 PROCEDURE — 2580000003 HC RX 258

## 2025-08-13 PROCEDURE — 3600000014 HC SURGERY LEVEL 4 ADDTL 15MIN: Performed by: NEUROLOGICAL SURGERY

## 2025-08-13 PROCEDURE — 6360000002 HC RX W HCPCS: Performed by: ANESTHESIOLOGY

## 2025-08-13 PROCEDURE — C1763 CONN TISS, NON-HUMAN: HCPCS | Performed by: NEUROLOGICAL SURGERY

## 2025-08-13 PROCEDURE — 2000000000 HC ICU R&B

## 2025-08-13 PROCEDURE — 2720000010 HC SURG SUPPLY STERILE: Performed by: NEUROLOGICAL SURGERY

## 2025-08-13 PROCEDURE — 8E09XBZ COMPUTER ASSISTED PROCEDURE OF HEAD AND NECK REGION: ICD-10-PCS | Performed by: NEUROLOGICAL SURGERY

## 2025-08-13 PROCEDURE — 3700000000 HC ANESTHESIA ATTENDED CARE: Performed by: NEUROLOGICAL SURGERY

## 2025-08-13 PROCEDURE — 2500000003 HC RX 250 WO HCPCS

## 2025-08-13 PROCEDURE — 2500000003 HC RX 250 WO HCPCS: Performed by: NEUROLOGICAL SURGERY

## 2025-08-13 PROCEDURE — 6360000002 HC RX W HCPCS: Performed by: NEUROLOGICAL SURGERY

## 2025-08-13 PROCEDURE — 70450 CT HEAD/BRAIN W/O DYE: CPT

## 2025-08-13 PROCEDURE — 2500000003 HC RX 250 WO HCPCS: Performed by: NURSE PRACTITIONER

## 2025-08-13 PROCEDURE — 86901 BLOOD TYPING SEROLOGIC RH(D): CPT

## 2025-08-13 PROCEDURE — 6360000002 HC RX W HCPCS

## 2025-08-13 PROCEDURE — 3700000001 HC ADD 15 MINUTES (ANESTHESIA): Performed by: NEUROLOGICAL SURGERY

## 2025-08-13 PROCEDURE — 4A11X4G MONITORING OF PERIPHERAL NERVOUS ELECTRICAL ACTIVITY, INTRAOPERATIVE, EXTERNAL APPROACH: ICD-10-PCS | Performed by: NEUROLOGICAL SURGERY

## 2025-08-13 PROCEDURE — 2500000003 HC RX 250 WO HCPCS: Performed by: PHYSICIAN ASSISTANT

## 2025-08-13 PROCEDURE — 2580000003 HC RX 258: Performed by: ANESTHESIOLOGY

## 2025-08-13 PROCEDURE — 3600000004 HC SURGERY LEVEL 4 BASE: Performed by: NEUROLOGICAL SURGERY

## 2025-08-13 PROCEDURE — APPNB45 APP NON BILLABLE 31-45 MINUTES

## 2025-08-13 PROCEDURE — 86850 RBC ANTIBODY SCREEN: CPT

## 2025-08-13 PROCEDURE — 00NK0ZZ RELEASE TRIGEMINAL NERVE, OPEN APPROACH: ICD-10-PCS | Performed by: NEUROLOGICAL SURGERY

## 2025-08-13 PROCEDURE — APPNB45 APP NON BILLABLE 31-45 MINUTES: Performed by: NURSE PRACTITIONER

## 2025-08-13 PROCEDURE — 86900 BLOOD TYPING SEROLOGIC ABO: CPT

## 2025-08-13 PROCEDURE — 7100000001 HC PACU RECOVERY - ADDTL 15 MIN: Performed by: NEUROLOGICAL SURGERY

## 2025-08-13 PROCEDURE — 00U20KZ SUPPLEMENT DURA MATER WITH NONAUTOLOGOUS TISSUE SUBSTITUTE, OPEN APPROACH: ICD-10-PCS | Performed by: NEUROLOGICAL SURGERY

## 2025-08-13 PROCEDURE — C1781 MESH (IMPLANTABLE): HCPCS | Performed by: NEUROLOGICAL SURGERY

## 2025-08-13 PROCEDURE — 6370000000 HC RX 637 (ALT 250 FOR IP): Performed by: PHYSICIAN ASSISTANT

## 2025-08-13 PROCEDURE — 6360000002 HC RX W HCPCS: Performed by: NURSE PRACTITIONER

## 2025-08-13 PROCEDURE — 2709999900 HC NON-CHARGEABLE SUPPLY: Performed by: NEUROLOGICAL SURGERY

## 2025-08-13 PROCEDURE — 4A10X4G MONITORING OF CENTRAL NERVOUS ELECTRICAL ACTIVITY, INTRAOPERATIVE, EXTERNAL APPROACH: ICD-10-PCS | Performed by: NEUROLOGICAL SURGERY

## 2025-08-13 DEVICE — FELT SURG W1XL1IN THK1.65MM SQ PTFE STRL: Type: IMPLANTABLE DEVICE | Status: FUNCTIONAL

## 2025-08-13 DEVICE — PATCH DURA REP W4XL4CM BOV PERICARD GLUTARHYD NONPYROGENIC: Type: IMPLANTABLE DEVICE | Site: BRAIN | Status: FUNCTIONAL

## 2025-08-13 RX ORDER — OXYCODONE HYDROCHLORIDE 5 MG/1
5 TABLET ORAL EVERY 4 HOURS PRN
Status: DISCONTINUED | OUTPATIENT
Start: 2025-08-13 | End: 2025-08-14 | Stop reason: HOSPADM

## 2025-08-13 RX ORDER — ONDANSETRON 2 MG/ML
4 INJECTION INTRAMUSCULAR; INTRAVENOUS EVERY 6 HOURS PRN
Status: DISCONTINUED | OUTPATIENT
Start: 2025-08-13 | End: 2025-08-14 | Stop reason: HOSPADM

## 2025-08-13 RX ORDER — MEPERIDINE HYDROCHLORIDE 25 MG/ML
12.5 INJECTION INTRAMUSCULAR; INTRAVENOUS; SUBCUTANEOUS EVERY 5 MIN PRN
Status: DISCONTINUED | OUTPATIENT
Start: 2025-08-13 | End: 2025-08-13 | Stop reason: HOSPADM

## 2025-08-13 RX ORDER — PROCHLORPERAZINE EDISYLATE 5 MG/ML
10 INJECTION INTRAMUSCULAR; INTRAVENOUS EVERY 6 HOURS PRN
Status: DISCONTINUED | OUTPATIENT
Start: 2025-08-13 | End: 2025-08-14 | Stop reason: HOSPADM

## 2025-08-13 RX ORDER — SODIUM CHLORIDE 9 MG/ML
INJECTION, SOLUTION INTRAVENOUS PRN
Status: DISCONTINUED | OUTPATIENT
Start: 2025-08-13 | End: 2025-08-13 | Stop reason: HOSPADM

## 2025-08-13 RX ORDER — DEXAMETHASONE 4 MG/1
4 TABLET ORAL EVERY 12 HOURS SCHEDULED
Status: DISCONTINUED | OUTPATIENT
Start: 2025-08-15 | End: 2025-08-14 | Stop reason: HOSPADM

## 2025-08-13 RX ORDER — REMIFENTANIL HYDROCHLORIDE 1 MG/ML
INJECTION, POWDER, LYOPHILIZED, FOR SOLUTION INTRAVENOUS
Status: DISCONTINUED | OUTPATIENT
Start: 2025-08-13 | End: 2025-08-13 | Stop reason: SDUPTHER

## 2025-08-13 RX ORDER — SODIUM CHLORIDE, SODIUM LACTATE, POTASSIUM CHLORIDE, CALCIUM CHLORIDE 600; 310; 30; 20 MG/100ML; MG/100ML; MG/100ML; MG/100ML
INJECTION, SOLUTION INTRAVENOUS
Status: DISCONTINUED | OUTPATIENT
Start: 2025-08-13 | End: 2025-08-13 | Stop reason: SDUPTHER

## 2025-08-13 RX ORDER — TRAZODONE HYDROCHLORIDE 50 MG/1
50 TABLET ORAL NIGHTLY
Status: DISCONTINUED | OUTPATIENT
Start: 2025-08-13 | End: 2025-08-13

## 2025-08-13 RX ORDER — NALOXONE HYDROCHLORIDE 0.4 MG/ML
0.2 INJECTION, SOLUTION INTRAMUSCULAR; INTRAVENOUS; SUBCUTANEOUS PRN
Status: DISCONTINUED | OUTPATIENT
Start: 2025-08-13 | End: 2025-08-14 | Stop reason: HOSPADM

## 2025-08-13 RX ORDER — PROPOFOL 10 MG/ML
INJECTION, EMULSION INTRAVENOUS
Status: DISCONTINUED | OUTPATIENT
Start: 2025-08-13 | End: 2025-08-13 | Stop reason: SDUPTHER

## 2025-08-13 RX ORDER — LABETALOL HYDROCHLORIDE 5 MG/ML
20 INJECTION, SOLUTION INTRAVENOUS EVERY 10 MIN PRN
Status: DISCONTINUED | OUTPATIENT
Start: 2025-08-13 | End: 2025-08-14 | Stop reason: HOSPADM

## 2025-08-13 RX ORDER — METOCLOPRAMIDE HYDROCHLORIDE 5 MG/ML
10 INJECTION INTRAMUSCULAR; INTRAVENOUS
Status: DISCONTINUED | OUTPATIENT
Start: 2025-08-13 | End: 2025-08-13 | Stop reason: HOSPADM

## 2025-08-13 RX ORDER — CIPROFLOXACIN HYDROCHLORIDE 3.5 MG/ML
SOLUTION/ DROPS TOPICAL PRN
Status: DISCONTINUED | OUTPATIENT
Start: 2025-08-13 | End: 2025-08-13 | Stop reason: HOSPADM

## 2025-08-13 RX ORDER — MANNITOL 250 MG/ML
INJECTION, SOLUTION INTRAVENOUS
Status: DISCONTINUED | OUTPATIENT
Start: 2025-08-13 | End: 2025-08-13 | Stop reason: SDUPTHER

## 2025-08-13 RX ORDER — PROMETHAZINE HYDROCHLORIDE 25 MG/1
12.5 TABLET ORAL EVERY 6 HOURS PRN
Status: DISCONTINUED | OUTPATIENT
Start: 2025-08-13 | End: 2025-08-14 | Stop reason: HOSPADM

## 2025-08-13 RX ORDER — GLYCOPYRROLATE 0.2 MG/ML
INJECTION INTRAMUSCULAR; INTRAVENOUS
Status: DISCONTINUED | OUTPATIENT
Start: 2025-08-13 | End: 2025-08-13 | Stop reason: SDUPTHER

## 2025-08-13 RX ORDER — DIPHENHYDRAMINE HYDROCHLORIDE 50 MG/ML
12.5 INJECTION, SOLUTION INTRAMUSCULAR; INTRAVENOUS
Status: DISCONTINUED | OUTPATIENT
Start: 2025-08-13 | End: 2025-08-13 | Stop reason: HOSPADM

## 2025-08-13 RX ORDER — DEXAMETHASONE 4 MG/1
4 TABLET ORAL EVERY 6 HOURS SCHEDULED
Status: COMPLETED | OUTPATIENT
Start: 2025-08-13 | End: 2025-08-14

## 2025-08-13 RX ORDER — SODIUM CHLORIDE 0.9 % (FLUSH) 0.9 %
5-40 SYRINGE (ML) INJECTION EVERY 12 HOURS SCHEDULED
Status: DISCONTINUED | OUTPATIENT
Start: 2025-08-13 | End: 2025-08-13 | Stop reason: HOSPADM

## 2025-08-13 RX ORDER — ESMOLOL HYDROCHLORIDE 10 MG/ML
INJECTION INTRAVENOUS
Status: DISCONTINUED | OUTPATIENT
Start: 2025-08-13 | End: 2025-08-13 | Stop reason: SDUPTHER

## 2025-08-13 RX ORDER — DIAZEPAM 5 MG/1
5 TABLET ORAL EVERY 6 HOURS PRN
Status: DISCONTINUED | OUTPATIENT
Start: 2025-08-13 | End: 2025-08-14 | Stop reason: HOSPADM

## 2025-08-13 RX ORDER — LABETALOL HYDROCHLORIDE 5 MG/ML
10 INJECTION, SOLUTION INTRAVENOUS
Status: DISCONTINUED | OUTPATIENT
Start: 2025-08-13 | End: 2025-08-13 | Stop reason: HOSPADM

## 2025-08-13 RX ORDER — HEPARIN SODIUM 5000 [USP'U]/ML
5000 INJECTION, SOLUTION INTRAVENOUS; SUBCUTANEOUS EVERY 8 HOURS
Status: DISCONTINUED | OUTPATIENT
Start: 2025-08-14 | End: 2025-08-14 | Stop reason: HOSPADM

## 2025-08-13 RX ORDER — POLYETHYLENE GLYCOL 3350 17 G/17G
17 POWDER, FOR SOLUTION ORAL DAILY
Status: DISCONTINUED | OUTPATIENT
Start: 2025-08-13 | End: 2025-08-14 | Stop reason: HOSPADM

## 2025-08-13 RX ORDER — MANNITOL 20 G/100ML
INJECTION, SOLUTION INTRAVENOUS
Status: DISCONTINUED | OUTPATIENT
Start: 2025-08-13 | End: 2025-08-13

## 2025-08-13 RX ORDER — GABAPENTIN 100 MG/1
200 CAPSULE ORAL NIGHTLY
Status: DISCONTINUED | OUTPATIENT
Start: 2025-08-13 | End: 2025-08-14

## 2025-08-13 RX ORDER — LISINOPRIL 5 MG/1
5 TABLET ORAL NIGHTLY
Status: DISCONTINUED | OUTPATIENT
Start: 2025-08-13 | End: 2025-08-14 | Stop reason: HOSPADM

## 2025-08-13 RX ORDER — DEXAMETHASONE SODIUM PHOSPHATE 4 MG/ML
INJECTION, SOLUTION INTRA-ARTICULAR; INTRALESIONAL; INTRAMUSCULAR; INTRAVENOUS; SOFT TISSUE
Status: DISCONTINUED | OUTPATIENT
Start: 2025-08-13 | End: 2025-08-13 | Stop reason: SDUPTHER

## 2025-08-13 RX ORDER — SUCCINYLCHOLINE/SOD CL,ISO/PF 200MG/10ML
SYRINGE (ML) INTRAVENOUS
Status: DISCONTINUED | OUTPATIENT
Start: 2025-08-13 | End: 2025-08-13 | Stop reason: SDUPTHER

## 2025-08-13 RX ORDER — DEXAMETHASONE 4 MG/1
4 TABLET ORAL DAILY
Status: DISCONTINUED | OUTPATIENT
Start: 2025-08-17 | End: 2025-08-14 | Stop reason: HOSPADM

## 2025-08-13 RX ORDER — FAMOTIDINE 10 MG/ML
INJECTION, SOLUTION INTRAVENOUS
Status: DISCONTINUED | OUTPATIENT
Start: 2025-08-13 | End: 2025-08-13 | Stop reason: SDUPTHER

## 2025-08-13 RX ORDER — HYDRALAZINE HYDROCHLORIDE 20 MG/ML
10 INJECTION INTRAMUSCULAR; INTRAVENOUS
Status: DISCONTINUED | OUTPATIENT
Start: 2025-08-13 | End: 2025-08-13 | Stop reason: HOSPADM

## 2025-08-13 RX ORDER — SODIUM CHLORIDE 9 MG/ML
INJECTION, SOLUTION INTRAVENOUS CONTINUOUS
Status: DISCONTINUED | OUTPATIENT
Start: 2025-08-13 | End: 2025-08-14

## 2025-08-13 RX ORDER — BISACODYL 10 MG
10 SUPPOSITORY, RECTAL RECTAL DAILY PRN
Status: DISCONTINUED | OUTPATIENT
Start: 2025-08-13 | End: 2025-08-14 | Stop reason: HOSPADM

## 2025-08-13 RX ORDER — DEXAMETHASONE 4 MG/1
4 TABLET ORAL EVERY 8 HOURS SCHEDULED
Status: DISCONTINUED | OUTPATIENT
Start: 2025-08-14 | End: 2025-08-14 | Stop reason: HOSPADM

## 2025-08-13 RX ORDER — MAGNESIUM HYDROXIDE/ALUMINUM HYDROXICE/SIMETHICONE 120; 1200; 1200 MG/30ML; MG/30ML; MG/30ML
15 SUSPENSION ORAL EVERY 6 HOURS PRN
Status: DISCONTINUED | OUTPATIENT
Start: 2025-08-13 | End: 2025-08-14 | Stop reason: HOSPADM

## 2025-08-13 RX ORDER — SODIUM CHLORIDE, SODIUM LACTATE, POTASSIUM CHLORIDE, AND CALCIUM CHLORIDE .6; .31; .03; .02 G/100ML; G/100ML; G/100ML; G/100ML
IRRIGANT IRRIGATION PRN
Status: DISCONTINUED | OUTPATIENT
Start: 2025-08-13 | End: 2025-08-13 | Stop reason: HOSPADM

## 2025-08-13 RX ORDER — SODIUM CHLORIDE 0.9 % (FLUSH) 0.9 %
5-40 SYRINGE (ML) INJECTION PRN
Status: DISCONTINUED | OUTPATIENT
Start: 2025-08-13 | End: 2025-08-14 | Stop reason: HOSPADM

## 2025-08-13 RX ORDER — SODIUM CHLORIDE 9 MG/ML
INJECTION, SOLUTION INTRAVENOUS PRN
Status: DISCONTINUED | OUTPATIENT
Start: 2025-08-13 | End: 2025-08-14 | Stop reason: HOSPADM

## 2025-08-13 RX ORDER — SODIUM CHLORIDE, SODIUM LACTATE, POTASSIUM CHLORIDE, CALCIUM CHLORIDE 600; 310; 30; 20 MG/100ML; MG/100ML; MG/100ML; MG/100ML
INJECTION, SOLUTION INTRAVENOUS CONTINUOUS
Status: DISCONTINUED | OUTPATIENT
Start: 2025-08-13 | End: 2025-08-13 | Stop reason: HOSPADM

## 2025-08-13 RX ORDER — ROCURONIUM BROMIDE 10 MG/ML
INJECTION, SOLUTION INTRAVENOUS
Status: DISCONTINUED | OUTPATIENT
Start: 2025-08-13 | End: 2025-08-13 | Stop reason: SDUPTHER

## 2025-08-13 RX ORDER — KETAMINE HCL IN NACL, ISO-OSM 20 MG/2 ML
SYRINGE (ML) INJECTION
Status: DISCONTINUED | OUTPATIENT
Start: 2025-08-13 | End: 2025-08-13 | Stop reason: SDUPTHER

## 2025-08-13 RX ORDER — HYDROMORPHONE HYDROCHLORIDE 1 MG/ML
0.5 INJECTION, SOLUTION INTRAMUSCULAR; INTRAVENOUS; SUBCUTANEOUS EVERY 5 MIN PRN
Status: DISCONTINUED | OUTPATIENT
Start: 2025-08-13 | End: 2025-08-13 | Stop reason: HOSPADM

## 2025-08-13 RX ORDER — ONDANSETRON 2 MG/ML
INJECTION INTRAMUSCULAR; INTRAVENOUS
Status: DISCONTINUED | OUTPATIENT
Start: 2025-08-13 | End: 2025-08-13 | Stop reason: SDUPTHER

## 2025-08-13 RX ORDER — SODIUM CHLORIDE 0.9 % (FLUSH) 0.9 %
5-40 SYRINGE (ML) INJECTION EVERY 12 HOURS SCHEDULED
Status: DISCONTINUED | OUTPATIENT
Start: 2025-08-13 | End: 2025-08-14 | Stop reason: HOSPADM

## 2025-08-13 RX ORDER — MIDAZOLAM HYDROCHLORIDE 1 MG/ML
INJECTION, SOLUTION INTRAMUSCULAR; INTRAVENOUS
Status: DISCONTINUED | OUTPATIENT
Start: 2025-08-13 | End: 2025-08-13 | Stop reason: SDUPTHER

## 2025-08-13 RX ORDER — METHOCARBAMOL 750 MG/1
750 TABLET, FILM COATED ORAL EVERY 8 HOURS PRN
Status: DISCONTINUED | OUTPATIENT
Start: 2025-08-13 | End: 2025-08-14 | Stop reason: HOSPADM

## 2025-08-13 RX ORDER — SENNA AND DOCUSATE SODIUM 50; 8.6 MG/1; MG/1
1 TABLET, FILM COATED ORAL 2 TIMES DAILY
Status: DISCONTINUED | OUTPATIENT
Start: 2025-08-13 | End: 2025-08-14 | Stop reason: HOSPADM

## 2025-08-13 RX ORDER — LIDOCAINE HYDROCHLORIDE 20 MG/ML
INJECTION, SOLUTION INTRAVENOUS
Status: DISCONTINUED | OUTPATIENT
Start: 2025-08-13 | End: 2025-08-13 | Stop reason: SDUPTHER

## 2025-08-13 RX ORDER — HYDROMORPHONE HYDROCHLORIDE 1 MG/ML
0.5 INJECTION, SOLUTION INTRAMUSCULAR; INTRAVENOUS; SUBCUTANEOUS
Status: DISCONTINUED | OUTPATIENT
Start: 2025-08-13 | End: 2025-08-13 | Stop reason: HOSPADM

## 2025-08-13 RX ORDER — LIDOCAINE HYDROCHLORIDE AND EPINEPHRINE 10; 10 MG/ML; UG/ML
INJECTION, SOLUTION INFILTRATION; PERINEURAL PRN
Status: DISCONTINUED | OUTPATIENT
Start: 2025-08-13 | End: 2025-08-13 | Stop reason: HOSPADM

## 2025-08-13 RX ORDER — SODIUM CHLORIDE 0.9 % (FLUSH) 0.9 %
5-40 SYRINGE (ML) INJECTION PRN
Status: DISCONTINUED | OUTPATIENT
Start: 2025-08-13 | End: 2025-08-13 | Stop reason: HOSPADM

## 2025-08-13 RX ORDER — ROSUVASTATIN CALCIUM 5 MG/1
10 TABLET, COATED ORAL NIGHTLY
Status: DISCONTINUED | OUTPATIENT
Start: 2025-08-13 | End: 2025-08-14 | Stop reason: HOSPADM

## 2025-08-13 RX ORDER — OXYCODONE HYDROCHLORIDE 5 MG/1
10 TABLET ORAL EVERY 4 HOURS PRN
Status: DISCONTINUED | OUTPATIENT
Start: 2025-08-13 | End: 2025-08-14 | Stop reason: HOSPADM

## 2025-08-13 RX ORDER — CETIRIZINE HYDROCHLORIDE 10 MG/1
10 TABLET ORAL DAILY
Status: DISCONTINUED | OUTPATIENT
Start: 2025-08-13 | End: 2025-08-14 | Stop reason: HOSPADM

## 2025-08-13 RX ADMIN — ONDANSETRON 4 MG: 2 INJECTION, SOLUTION INTRAMUSCULAR; INTRAVENOUS at 11:45

## 2025-08-13 RX ADMIN — WATER 2000 MG: 1 INJECTION INTRAMUSCULAR; INTRAVENOUS; SUBCUTANEOUS at 17:06

## 2025-08-13 RX ADMIN — MANNITOL 40 G: 12.5 INJECTION, SOLUTION INTRAVENOUS at 10:08

## 2025-08-13 RX ADMIN — REMIFENTANIL HYDROCHLORIDE 0.12 MCG/KG/MIN: 1 INJECTION, POWDER, LYOPHILIZED, FOR SOLUTION INTRAVENOUS at 09:25

## 2025-08-13 RX ADMIN — POLYETHYLENE GLYCOL 3350 17 G: 17 POWDER, FOR SOLUTION ORAL at 16:50

## 2025-08-13 RX ADMIN — ROSUVASTATIN CALCIUM 10 MG: 5 TABLET, FILM COATED ORAL at 20:11

## 2025-08-13 RX ADMIN — HYDROMORPHONE HYDROCHLORIDE 0.25 MG: 1 INJECTION, SOLUTION INTRAMUSCULAR; INTRAVENOUS; SUBCUTANEOUS at 10:25

## 2025-08-13 RX ADMIN — DEXAMETHASONE SODIUM PHOSPHATE 10 MG: 4 INJECTION INTRA-ARTICULAR; INTRALESIONAL; INTRAMUSCULAR; INTRAVENOUS; SOFT TISSUE at 09:40

## 2025-08-13 RX ADMIN — HYDROMORPHONE HYDROCHLORIDE 0.5 MG: 1 INJECTION, SOLUTION INTRAMUSCULAR; INTRAVENOUS; SUBCUTANEOUS at 13:47

## 2025-08-13 RX ADMIN — SUGAMMADEX 200 MG: 100 INJECTION, SOLUTION INTRAVENOUS at 12:34

## 2025-08-13 RX ADMIN — Medication 20 MG: at 11:01

## 2025-08-13 RX ADMIN — LIDOCAINE HYDROCHLORIDE 40 MG: 20 INJECTION, SOLUTION INTRAVENOUS at 09:27

## 2025-08-13 RX ADMIN — PROPOFOL 140 MG: 10 INJECTION, EMULSION INTRAVENOUS at 09:27

## 2025-08-13 RX ADMIN — HYDROMORPHONE HYDROCHLORIDE 0.25 MG: 1 INJECTION, SOLUTION INTRAMUSCULAR; INTRAVENOUS; SUBCUTANEOUS at 10:22

## 2025-08-13 RX ADMIN — HYDROMORPHONE HYDROCHLORIDE 0.5 MG: 1 INJECTION, SOLUTION INTRAMUSCULAR; INTRAVENOUS; SUBCUTANEOUS at 10:51

## 2025-08-13 RX ADMIN — FAMOTIDINE 20 MG: 10 INJECTION, SOLUTION INTRAVENOUS at 11:45

## 2025-08-13 RX ADMIN — PROPOFOL 50 MG: 10 INJECTION, EMULSION INTRAVENOUS at 10:25

## 2025-08-13 RX ADMIN — ESMOLOL HYDROCHLORIDE 10 MG: 10 INJECTION, SOLUTION INTRAVENOUS at 10:51

## 2025-08-13 RX ADMIN — CEFAZOLIN SODIUM 2000 MG: 1 POWDER, FOR SOLUTION INTRAMUSCULAR; INTRAVENOUS at 09:31

## 2025-08-13 RX ADMIN — CARBAMAZEPINE 300 MG: 200 TABLET, EXTENDED RELEASE ORAL at 20:12

## 2025-08-13 RX ADMIN — PROPOFOL 130 MCG/KG/MIN: 10 INJECTION, EMULSION INTRAVENOUS at 09:25

## 2025-08-13 RX ADMIN — SENNOSIDES, DOCUSATE SODIUM 1 TABLET: 50; 8.6 TABLET, FILM COATED ORAL at 20:11

## 2025-08-13 RX ADMIN — Medication 20 MG: at 10:17

## 2025-08-13 RX ADMIN — SODIUM CHLORIDE, SODIUM LACTATE, POTASSIUM CHLORIDE, AND CALCIUM CHLORIDE: .6; .31; .03; .02 INJECTION, SOLUTION INTRAVENOUS at 11:32

## 2025-08-13 RX ADMIN — SODIUM CHLORIDE: 0.9 INJECTION, SOLUTION INTRAVENOUS at 16:48

## 2025-08-13 RX ADMIN — PROPOFOL 50 MG: 10 INJECTION, EMULSION INTRAVENOUS at 11:28

## 2025-08-13 RX ADMIN — ROCURONIUM BROMIDE 25 MG: 10 INJECTION, SOLUTION INTRAVENOUS at 12:04

## 2025-08-13 RX ADMIN — REMIFENTANIL HYDROCHLORIDE 50 MCG: 1 INJECTION, POWDER, LYOPHILIZED, FOR SOLUTION INTRAVENOUS at 09:27

## 2025-08-13 RX ADMIN — LISINOPRIL 5 MG: 5 TABLET ORAL at 20:11

## 2025-08-13 RX ADMIN — GABAPENTIN 200 MG: 100 CAPSULE ORAL at 20:11

## 2025-08-13 RX ADMIN — PROPOFOL 50 MG: 10 INJECTION, EMULSION INTRAVENOUS at 10:50

## 2025-08-13 RX ADMIN — SODIUM CHLORIDE, PRESERVATIVE FREE 10 ML: 5 INJECTION INTRAVENOUS at 20:11

## 2025-08-13 RX ADMIN — SODIUM CHLORIDE, PRESERVATIVE FREE 20 MG: 5 INJECTION INTRAVENOUS at 20:13

## 2025-08-13 RX ADMIN — SODIUM CHLORIDE, SODIUM LACTATE, POTASSIUM CHLORIDE, AND CALCIUM CHLORIDE: .6; .31; .03; .02 INJECTION, SOLUTION INTRAVENOUS at 09:19

## 2025-08-13 RX ADMIN — DEXMEDETOMIDINE HYDROCHLORIDE 6 MCG: 100 INJECTION, SOLUTION INTRAVENOUS at 11:44

## 2025-08-13 RX ADMIN — DEXAMETHASONE 4 MG: 4 TABLET ORAL at 17:12

## 2025-08-13 RX ADMIN — SODIUM CHLORIDE, SODIUM LACTATE, POTASSIUM CHLORIDE, AND CALCIUM CHLORIDE: .6; .31; .03; .02 INJECTION, SOLUTION INTRAVENOUS at 08:30

## 2025-08-13 RX ADMIN — GLYCOPYRROLATE 0.2 MG: 0.2 INJECTION INTRAMUSCULAR; INTRAVENOUS at 09:41

## 2025-08-13 RX ADMIN — CETIRIZINE HYDROCHLORIDE 10 MG: 10 TABLET, FILM COATED ORAL at 16:50

## 2025-08-13 RX ADMIN — MIDAZOLAM HYDROCHLORIDE 2 MG: 1 INJECTION, SOLUTION INTRAMUSCULAR; INTRAVENOUS at 09:19

## 2025-08-13 RX ADMIN — Medication 100 MG: at 09:27

## 2025-08-13 RX ADMIN — ESMOLOL HYDROCHLORIDE 20 MG: 10 INJECTION, SOLUTION INTRAVENOUS at 10:34

## 2025-08-13 RX ADMIN — PHENYLEPHRINE HYDROCHLORIDE 15 MCG/MIN: 10 INJECTION, SOLUTION INTRAVENOUS at 09:49

## 2025-08-13 ASSESSMENT — PAIN SCALES - GENERAL
PAINLEVEL_OUTOF10: 0
PAINLEVEL_OUTOF10: 10
PAINLEVEL_OUTOF10: 4
PAINLEVEL_OUTOF10: 5
PAINLEVEL_OUTOF10: 0
PAINLEVEL_OUTOF10: 6

## 2025-08-13 ASSESSMENT — PAIN DESCRIPTION - LOCATION
LOCATION: HEAD

## 2025-08-13 ASSESSMENT — PAIN DESCRIPTION - PAIN TYPE
TYPE: SURGICAL PAIN
TYPE: ACUTE PAIN
TYPE: ACUTE PAIN

## 2025-08-13 ASSESSMENT — PAIN DESCRIPTION - FREQUENCY
FREQUENCY: CONTINUOUS

## 2025-08-13 ASSESSMENT — PAIN DESCRIPTION - DESCRIPTORS
DESCRIPTORS: PRESSURE
DESCRIPTORS: PRESSURE
DESCRIPTORS: DISCOMFORT

## 2025-08-13 ASSESSMENT — PAIN DESCRIPTION - ORIENTATION
ORIENTATION: LEFT
ORIENTATION: MID
ORIENTATION: MID

## 2025-08-13 ASSESSMENT — PAIN DESCRIPTION - ONSET
ONSET: ON-GOING

## 2025-08-13 ASSESSMENT — PAIN - FUNCTIONAL ASSESSMENT
PAIN_FUNCTIONAL_ASSESSMENT: ACTIVITIES ARE NOT PREVENTED
PAIN_FUNCTIONAL_ASSESSMENT: ACTIVITIES ARE NOT PREVENTED
PAIN_FUNCTIONAL_ASSESSMENT: 0-10

## 2025-08-14 VITALS
HEIGHT: 66 IN | WEIGHT: 150.79 LBS | BODY MASS INDEX: 24.23 KG/M2 | RESPIRATION RATE: 18 BRPM | SYSTOLIC BLOOD PRESSURE: 137 MMHG | HEART RATE: 91 BPM | OXYGEN SATURATION: 95 % | DIASTOLIC BLOOD PRESSURE: 91 MMHG | TEMPERATURE: 98.3 F

## 2025-08-14 PROBLEM — Z98.890 S/P CRANIOTOMY: Status: ACTIVE | Noted: 2025-08-14

## 2025-08-14 LAB
ANION GAP SERPL CALCULATED.3IONS-SCNC: 10 MMOL/L (ref 3–16)
BUN SERPL-MCNC: 9 MG/DL (ref 7–20)
CALCIUM SERPL-MCNC: 7.8 MG/DL (ref 8.3–10.6)
CHLORIDE SERPL-SCNC: 98 MMOL/L (ref 99–110)
CO2 SERPL-SCNC: 24 MMOL/L (ref 21–32)
CREAT SERPL-MCNC: 0.6 MG/DL (ref 0.6–1.1)
DEPRECATED RDW RBC AUTO: 14.5 % (ref 12.4–15.4)
GFR SERPLBLD CREATININE-BSD FMLA CKD-EPI: >90 ML/MIN/{1.73_M2}
GLUCOSE BLD-MCNC: 149 MG/DL (ref 70–99)
GLUCOSE SERPL-MCNC: 120 MG/DL (ref 70–99)
HCT VFR BLD AUTO: 38.4 % (ref 36–48)
HGB BLD-MCNC: 13.1 G/DL (ref 12–16)
MCH RBC QN AUTO: 30.2 PG (ref 26–34)
MCHC RBC AUTO-ENTMCNC: 34.1 G/DL (ref 31–36)
MCV RBC AUTO: 88.6 FL (ref 80–100)
PERFORMED ON: ABNORMAL
PLATELET # BLD AUTO: 156 K/UL (ref 135–450)
PMV BLD AUTO: 8 FL (ref 5–10.5)
POTASSIUM SERPL-SCNC: 4.3 MMOL/L (ref 3.5–5.1)
RBC # BLD AUTO: 4.34 M/UL (ref 4–5.2)
SODIUM SERPL-SCNC: 132 MMOL/L (ref 136–145)
WBC # BLD AUTO: 12.5 K/UL (ref 4–11)

## 2025-08-14 PROCEDURE — APPNB45 APP NON BILLABLE 31-45 MINUTES: Performed by: NURSE PRACTITIONER

## 2025-08-14 PROCEDURE — 6360000002 HC RX W HCPCS: Performed by: NURSE PRACTITIONER

## 2025-08-14 PROCEDURE — 97161 PT EVAL LOW COMPLEX 20 MIN: CPT

## 2025-08-14 PROCEDURE — 97535 SELF CARE MNGMENT TRAINING: CPT

## 2025-08-14 PROCEDURE — 6360000002 HC RX W HCPCS: Performed by: NEUROLOGICAL SURGERY

## 2025-08-14 PROCEDURE — 2500000003 HC RX 250 WO HCPCS: Performed by: NURSE PRACTITIONER

## 2025-08-14 PROCEDURE — 6360000002 HC RX W HCPCS: Performed by: PHYSICIAN ASSISTANT

## 2025-08-14 PROCEDURE — 6370000000 HC RX 637 (ALT 250 FOR IP): Performed by: PHYSICIAN ASSISTANT

## 2025-08-14 PROCEDURE — 99024 POSTOP FOLLOW-UP VISIT: CPT | Performed by: NURSE PRACTITIONER

## 2025-08-14 PROCEDURE — 80048 BASIC METABOLIC PNL TOTAL CA: CPT

## 2025-08-14 PROCEDURE — 36415 COLL VENOUS BLD VENIPUNCTURE: CPT

## 2025-08-14 PROCEDURE — 85027 COMPLETE CBC AUTOMATED: CPT

## 2025-08-14 PROCEDURE — 97116 GAIT TRAINING THERAPY: CPT

## 2025-08-14 PROCEDURE — 97166 OT EVAL MOD COMPLEX 45 MIN: CPT

## 2025-08-14 PROCEDURE — 2500000003 HC RX 250 WO HCPCS: Performed by: PHYSICIAN ASSISTANT

## 2025-08-14 PROCEDURE — 2580000003 HC RX 258: Performed by: PHYSICIAN ASSISTANT

## 2025-08-14 RX ORDER — SENNA AND DOCUSATE SODIUM 50; 8.6 MG/1; MG/1
1 TABLET, FILM COATED ORAL 2 TIMES DAILY
COMMUNITY
Start: 2025-08-14

## 2025-08-14 RX ORDER — FAMOTIDINE 20 MG/1
20 TABLET, FILM COATED ORAL 2 TIMES DAILY
Status: DISCONTINUED | OUTPATIENT
Start: 2025-08-14 | End: 2025-08-14 | Stop reason: HOSPADM

## 2025-08-14 RX ORDER — POLYETHYLENE GLYCOL 3350 17 G/17G
17 POWDER, FOR SOLUTION ORAL DAILY
COMMUNITY
Start: 2025-08-15 | End: 2025-09-14

## 2025-08-14 RX ORDER — FAMOTIDINE 20 MG/1
20 TABLET, FILM COATED ORAL 2 TIMES DAILY
Qty: 28 TABLET | Refills: 0 | Status: SHIPPED | OUTPATIENT
Start: 2025-08-14 | End: 2025-08-28

## 2025-08-14 RX ORDER — DEXAMETHASONE 4 MG/1
TABLET ORAL
Qty: 10 TABLET | Refills: 0 | Status: CANCELLED | OUTPATIENT
Start: 2025-08-14 | End: 2025-08-18

## 2025-08-14 RX ORDER — OXYCODONE HYDROCHLORIDE 5 MG/1
5 TABLET ORAL EVERY 6 HOURS PRN
Qty: 28 TABLET | Refills: 0 | Status: SHIPPED | OUTPATIENT
Start: 2025-08-14 | End: 2025-08-21

## 2025-08-14 RX ORDER — METHOCARBAMOL 750 MG/1
750 TABLET, FILM COATED ORAL EVERY 8 HOURS PRN
Qty: 21 TABLET | Refills: 0 | Status: SHIPPED | OUTPATIENT
Start: 2025-08-14 | End: 2025-08-21

## 2025-08-14 RX ORDER — CARBAMAZEPINE 200 MG/1
200 TABLET, EXTENDED RELEASE ORAL 2 TIMES DAILY
Status: DISCONTINUED | OUTPATIENT
Start: 2025-08-14 | End: 2025-08-14 | Stop reason: HOSPADM

## 2025-08-14 RX ADMIN — DEXAMETHASONE 4 MG: 4 TABLET ORAL at 05:13

## 2025-08-14 RX ADMIN — WATER 2000 MG: 1 INJECTION INTRAMUSCULAR; INTRAVENOUS; SUBCUTANEOUS at 08:51

## 2025-08-14 RX ADMIN — SODIUM CHLORIDE, PRESERVATIVE FREE 10 ML: 5 INJECTION INTRAVENOUS at 08:56

## 2025-08-14 RX ADMIN — SODIUM CHLORIDE: 0.9 INJECTION, SOLUTION INTRAVENOUS at 03:05

## 2025-08-14 RX ADMIN — METHOCARBAMOL 1000 MG: 100 INJECTION INTRAMUSCULAR; INTRAVENOUS at 03:06

## 2025-08-14 RX ADMIN — SENNOSIDES, DOCUSATE SODIUM 1 TABLET: 50; 8.6 TABLET, FILM COATED ORAL at 08:56

## 2025-08-14 RX ADMIN — DEXAMETHASONE 4 MG: 4 TABLET ORAL at 12:13

## 2025-08-14 RX ADMIN — WATER 2000 MG: 1 INJECTION INTRAMUSCULAR; INTRAVENOUS; SUBCUTANEOUS at 00:57

## 2025-08-14 RX ADMIN — CETIRIZINE HYDROCHLORIDE 10 MG: 10 TABLET, FILM COATED ORAL at 08:56

## 2025-08-14 RX ADMIN — DEXAMETHASONE 4 MG: 4 TABLET ORAL at 00:53

## 2025-08-14 RX ADMIN — CARBAMAZEPINE 300 MG: 200 TABLET, EXTENDED RELEASE ORAL at 08:55

## 2025-08-14 RX ADMIN — SODIUM CHLORIDE, PRESERVATIVE FREE 20 MG: 5 INJECTION INTRAVENOUS at 08:51

## 2025-08-14 ASSESSMENT — PAIN DESCRIPTION - LOCATION
LOCATION: BACK
LOCATION: HEAD

## 2025-08-14 ASSESSMENT — PAIN DESCRIPTION - ORIENTATION
ORIENTATION: MID
ORIENTATION: MID

## 2025-08-14 ASSESSMENT — PAIN DESCRIPTION - FREQUENCY
FREQUENCY: CONTINUOUS
FREQUENCY: CONTINUOUS

## 2025-08-14 ASSESSMENT — PAIN SCALES - GENERAL
PAINLEVEL_OUTOF10: 5
PAINLEVEL_OUTOF10: 0
PAINLEVEL_OUTOF10: 0
PAINLEVEL_OUTOF10: 6
PAINLEVEL_OUTOF10: 0

## 2025-08-14 ASSESSMENT — PAIN - FUNCTIONAL ASSESSMENT
PAIN_FUNCTIONAL_ASSESSMENT: ACTIVITIES ARE NOT PREVENTED
PAIN_FUNCTIONAL_ASSESSMENT: ACTIVITIES ARE NOT PREVENTED

## 2025-08-14 ASSESSMENT — PAIN DESCRIPTION - DESCRIPTORS
DESCRIPTORS: PRESSURE
DESCRIPTORS: PRESSURE

## 2025-08-14 ASSESSMENT — PAIN DESCRIPTION - ONSET
ONSET: ON-GOING
ONSET: ON-GOING

## 2025-08-14 ASSESSMENT — PAIN DESCRIPTION - PAIN TYPE
TYPE: ACUTE PAIN
TYPE: ACUTE PAIN

## 2025-08-18 ENCOUNTER — APPOINTMENT (OUTPATIENT)
Dept: CT IMAGING | Age: 60
End: 2025-08-18
Payer: OTHER GOVERNMENT

## 2025-08-18 ENCOUNTER — HOSPITAL ENCOUNTER (EMERGENCY)
Age: 60
Discharge: HOME | End: 2025-08-18
Attending: EMERGENCY MEDICINE
Payer: OTHER GOVERNMENT

## 2025-08-18 VITALS
DIASTOLIC BLOOD PRESSURE: 78 MMHG | OXYGEN SATURATION: 99 % | TEMPERATURE: 98.8 F | RESPIRATION RATE: 14 BRPM | HEART RATE: 78 BPM | SYSTOLIC BLOOD PRESSURE: 139 MMHG

## 2025-08-18 DIAGNOSIS — I26.93 SINGLE SUBSEGMENTAL PULMONARY EMBOLISM WITHOUT ACUTE COR PULMONALE (HCC): Primary | ICD-10-CM

## 2025-08-18 LAB
ALBUMIN SERPL-MCNC: 3.8 G/DL (ref 3.4–5)
ALBUMIN/GLOB SERPL: 1.4 {RATIO} (ref 1.1–2.2)
ALP SERPL-CCNC: 87 U/L (ref 40–129)
ALT SERPL-CCNC: 83 U/L (ref 10–40)
ANION GAP SERPL CALCULATED.3IONS-SCNC: 10 MMOL/L (ref 3–16)
AST SERPL-CCNC: 52 U/L (ref 15–37)
BASOPHILS # BLD: 0 K/UL (ref 0–0.2)
BASOPHILS NFR BLD: 0.3 %
BILIRUB SERPL-MCNC: 0.3 MG/DL (ref 0–1)
BUN SERPL-MCNC: 16 MG/DL (ref 7–20)
CALCIUM SERPL-MCNC: 9.2 MG/DL (ref 8.3–10.6)
CHLORIDE SERPL-SCNC: 96 MMOL/L (ref 99–110)
CO2 SERPL-SCNC: 27 MMOL/L (ref 21–32)
CREAT SERPL-MCNC: 0.6 MG/DL (ref 0.6–1.1)
D-DIMER QUANTITATIVE: 1.51 UG/ML FEU (ref 0–0.6)
DEPRECATED RDW RBC AUTO: 14.3 % (ref 12.4–15.4)
EOSINOPHIL # BLD: 0.1 K/UL (ref 0–0.6)
EOSINOPHIL NFR BLD: 1.6 %
GFR SERPLBLD CREATININE-BSD FMLA CKD-EPI: >90 ML/MIN/{1.73_M2}
GLUCOSE SERPL-MCNC: 101 MG/DL (ref 70–99)
HCT VFR BLD AUTO: 40.8 % (ref 36–48)
HGB BLD-MCNC: 14 G/DL (ref 12–16)
LYMPHOCYTES # BLD: 1.6 K/UL (ref 1–5.1)
LYMPHOCYTES NFR BLD: 17.1 %
MCH RBC QN AUTO: 30.5 PG (ref 26–34)
MCHC RBC AUTO-ENTMCNC: 34.2 G/DL (ref 31–36)
MCV RBC AUTO: 89.2 FL (ref 80–100)
MONOCYTES # BLD: 0.8 K/UL (ref 0–1.3)
MONOCYTES NFR BLD: 8.9 %
NEUTROPHILS # BLD: 6.8 K/UL (ref 1.7–7.7)
NEUTROPHILS NFR BLD: 72.1 %
PLATELET # BLD AUTO: 172 K/UL (ref 135–450)
PMV BLD AUTO: 7.5 FL (ref 5–10.5)
POTASSIUM SERPL-SCNC: 4.3 MMOL/L (ref 3.5–5.1)
PROT SERPL-MCNC: 6.6 G/DL (ref 6.4–8.2)
RBC # BLD AUTO: 4.58 M/UL (ref 4–5.2)
SODIUM SERPL-SCNC: 133 MMOL/L (ref 136–145)
WBC # BLD AUTO: 9.5 K/UL (ref 4–11)

## 2025-08-18 PROCEDURE — 85379 FIBRIN DEGRADATION QUANT: CPT

## 2025-08-18 PROCEDURE — 71260 CT THORAX DX C+: CPT

## 2025-08-18 PROCEDURE — 6360000004 HC RX CONTRAST MEDICATION: Performed by: EMERGENCY MEDICINE

## 2025-08-18 PROCEDURE — 80053 COMPREHEN METABOLIC PANEL: CPT

## 2025-08-18 PROCEDURE — 99285 EMERGENCY DEPT VISIT HI MDM: CPT

## 2025-08-18 PROCEDURE — 85025 COMPLETE CBC W/AUTO DIFF WBC: CPT

## 2025-08-18 RX ORDER — IOPAMIDOL 755 MG/ML
75 INJECTION, SOLUTION INTRAVASCULAR
Status: COMPLETED | OUTPATIENT
Start: 2025-08-18 | End: 2025-08-18

## 2025-08-18 RX ADMIN — IOPAMIDOL 75 ML: 755 INJECTION, SOLUTION INTRAVENOUS at 14:52

## 2025-08-22 ENCOUNTER — OFFICE VISIT (OUTPATIENT)
Dept: PRIMARY CARE CLINIC | Age: 60
End: 2025-08-22

## 2025-08-22 VITALS
DIASTOLIC BLOOD PRESSURE: 74 MMHG | SYSTOLIC BLOOD PRESSURE: 120 MMHG | BODY MASS INDEX: 23.5 KG/M2 | WEIGHT: 143.4 LBS | HEART RATE: 76 BPM | OXYGEN SATURATION: 99 %

## 2025-08-22 DIAGNOSIS — I26.93 SINGLE SUBSEGMENTAL PULMONARY EMBOLISM WITHOUT ACUTE COR PULMONALE (HCC): Primary | ICD-10-CM

## 2025-08-28 ENCOUNTER — HOSPITAL ENCOUNTER (OUTPATIENT)
Dept: VASCULAR LAB | Age: 60
Discharge: HOME | End: 2025-08-30
Attending: FAMILY MEDICINE
Payer: OTHER GOVERNMENT

## 2025-08-28 DIAGNOSIS — I26.93 SINGLE SUBSEGMENTAL PULMONARY EMBOLISM WITHOUT ACUTE COR PULMONALE (HCC): ICD-10-CM

## 2025-08-28 PROCEDURE — 93970 EXTREMITY STUDY: CPT | Performed by: SURGERY

## 2025-08-28 PROCEDURE — 93970 EXTREMITY STUDY: CPT

## (undated) DEVICE — UNDERGLOVE SURG SZ 8 BLU LTX FREE SYN POLYISOPRENE POLYMER

## (undated) DEVICE — GLOVE SURG SZ 7 L12IN FNGR THK79MIL GRN LTX FREE

## (undated) DEVICE — Device

## (undated) DEVICE — APPLICATOR MEDICATED 10.5 CC SOLUTION HI LT ORNG CHLORAPREP

## (undated) DEVICE — SURGIFOAM SPNG SZ 100

## (undated) DEVICE — SUTURE VICRYL SZ 2-0 L18IN ABSRB UD CT-1 L36MM 1/2 CIR J839D

## (undated) DEVICE — SUTURE VICRYL + SZ 2-0 L27IN ABSRB WHT SH 1/2 CIR TAPERCUT VCP417H

## (undated) DEVICE — DRAPE MICSCP W54XL150IN W/ 4 BINOC GLS LENS LEICA

## (undated) DEVICE — DRAPE SURG ST 3/4 SHEET W53XL77IN STD POLYPR 3 QTR DISP

## (undated) DEVICE — BLADE CLIPPER SURG SENSICLIP

## (undated) DEVICE — COVER LT HNDL CAM BLU DISP W/ SURG CTRL

## (undated) DEVICE — SUTURE NRLN SZ 4-0 L18IN NONABSORBABLE BLK L13MM TF 1/2 CIR C584D

## (undated) DEVICE — SYRINGE MEDICAL 3ML CLEAR PLASTIC STANDARD NON CONTROL LUERLOCK TIP DISPOSABLE

## (undated) DEVICE — AGENT HEMOSTATIC SURG ORIGINAL ABS 2X14IN LOOSE KNIT 12/CA

## (undated) DEVICE — AGENT HEMOSTATIC SURGIFLOW MATRIX KIT W/THROMBIN

## (undated) DEVICE — GLOVE SURG SZ 65 CRM LTX FREE POLYISOPRENE POLYMER BEAD ANTI

## (undated) DEVICE — ADAPTER LAB INTMED LUER 17GA

## (undated) DEVICE — KIT CATHETER 20GA L12CM ART CUST

## (undated) DEVICE — SEALANT SURG 13 YR DURA AUTOSPRAY ADHERUS

## (undated) DEVICE — HOOK RETRACT STERIL 12MM S STL BLNT E STAY LONE STAR

## (undated) DEVICE — SUTURE ETHILON SZ 3-0 L18IN NONABSORBABLE BLK PS-2 L19MM 3/8 1669H

## (undated) DEVICE — AGENT HEMSTAT W2XL4IN OXIDIZED REGENERATED CELOS ABSRB SFT

## (undated) DEVICE — GARMENT COMPR M FOR 12-18IN CALF INTMIT SGL BLDR HEMO-FORCE

## (undated) DEVICE — COVER XR CASS W21XL40IN UNIV ADH MICROSHIELD

## (undated) DEVICE — SUTURE MONOCRYL + SZ 4-0 L27IN ABSRB UD L19MM PS-2 3/8 CIR MCP426H

## (undated) DEVICE — SPONGE NEURO CTN WHT STRL XRAY DET 0.5X.05IN 10PK

## (undated) DEVICE — TRAY BSIN BASIC SGL FOR PROC W/O CONTRIBUTING EXCESSIVE WST

## (undated) DEVICE — MANIFOLD SUCT 4 PRT 2 CANSTR FLTR DISP NEPTUNE 2

## (undated) DEVICE — DRESSING TRNSPAR W2.375XL2.75IN STD FRME STYL WTRPRF BARR

## (undated) DEVICE — SYRINGE MED 10ML TRNSLUC BRL PLUNG BLK MRK POLYPR CTRL

## (undated) DEVICE — BUR SURG MTCH HD 3X3.8 MM 14 CM FLUT LG BOR MIDAS REX 8